# Patient Record
Sex: MALE | Race: BLACK OR AFRICAN AMERICAN | NOT HISPANIC OR LATINO | URBAN - METROPOLITAN AREA
[De-identification: names, ages, dates, MRNs, and addresses within clinical notes are randomized per-mention and may not be internally consistent; named-entity substitution may affect disease eponyms.]

---

## 2020-07-08 ENCOUNTER — HOSPITAL ENCOUNTER (EMERGENCY)
Facility: HOSPITAL | Age: 50
Discharge: HOME | End: 2020-07-09
Attending: EMERGENCY MEDICINE
Payer: COMMERCIAL

## 2020-07-08 VITALS
DIASTOLIC BLOOD PRESSURE: 81 MMHG | HEART RATE: 82 BPM | TEMPERATURE: 98.8 F | RESPIRATION RATE: 16 BRPM | OXYGEN SATURATION: 97 % | BODY MASS INDEX: 41.75 KG/M2 | WEIGHT: 315 LBS | SYSTOLIC BLOOD PRESSURE: 148 MMHG | HEIGHT: 73 IN

## 2020-07-08 DIAGNOSIS — G57.11 NEUROPATHY OF RIGHT LATERAL FEMORAL CUTANEOUS NERVE: Primary | ICD-10-CM

## 2020-07-08 PROCEDURE — 99282 EMERGENCY DEPT VISIT SF MDM: CPT

## 2020-07-08 RX ORDER — LISINOPRIL AND HYDROCHLOROTHIAZIDE 12.5; 2 MG/1; MG/1
2 TABLET ORAL DAILY
Status: ON HOLD | COMMUNITY
Start: 2020-06-13 | End: 2020-08-07 | Stop reason: SDUPTHER

## 2020-07-08 RX ORDER — AMLODIPINE BESYLATE 10 MG/1
10 TABLET ORAL DAILY
Status: ON HOLD | COMMUNITY
Start: 2020-06-07 | End: 2020-08-07 | Stop reason: SDUPTHER

## 2020-07-08 RX ORDER — FUROSEMIDE 20 MG/1
20 TABLET ORAL DAILY
COMMUNITY
Start: 2020-06-07 | End: 2020-08-07 | Stop reason: HOSPADM

## 2020-07-08 ASSESSMENT — ENCOUNTER SYMPTOMS
FEVER: 0
DIFFICULTY URINATING: 0
HEADACHES: 0
WEAKNESS: 0
COUGH: 0
NUMBNESS: 1
BACK PAIN: 0
ABDOMINAL PAIN: 0
SHORTNESS OF BREATH: 0

## 2020-07-09 NOTE — ED PROVIDER NOTES
HPI pt presents to ED for right lateral leg numbness for weaks. Pt has chronic pain in left knee from no cartilage per pt. Pt did not fall. Pt is morbidly obese and usually gets around in home with an office chair but the wheels broke. Pt requesting a wheel chair to get around at home. Pt has not fallen. Pt denies any fever, cough, headache, new weakness, abd pain, nausea, vomiting. Pt states his pcp told him to come to ED. Pt is morbidly obese and tends to put a lot of pressure on right thigh    Chief Complaint   Patient presents with   • Lower Extremity Issue       HPI     Patient History     Past Medical History:   Diagnosis Date   • Hypertension    • Sleep apnea        Past Surgical History:   Procedure Laterality Date   • KNEE SURGERY         History reviewed. No pertinent family history.    Social History     Tobacco Use   • Smoking status: Never Smoker   • Smokeless tobacco: Never Used   Substance Use Topics   • Alcohol use: Not on file   • Drug use: Not on file       Systems Reviewed from Nursing Triage:          Review of Systems     Review of Systems   Constitutional: Negative for fever.   HENT: Negative for congestion.    Eyes: Negative for visual disturbance.   Respiratory: Negative for cough and shortness of breath.    Cardiovascular: Negative for chest pain.   Gastrointestinal: Negative for abdominal pain.   Genitourinary: Negative for difficulty urinating.   Musculoskeletal: Negative for back pain.   Skin: Negative for rash.   Neurological: Positive for numbness. Negative for weakness and headaches.        Physical Exam     ED Triage Vitals [07/08/20 2222]   Temp Heart Rate Resp BP SpO2   37.1 °C (98.8 °F) 82 16 (!) 148/81 97 %      Temp Source Heart Rate Source Patient Position BP Location FiO2 (%) (Set)   Oral Monitor Lying Right upper arm --                     Patient Vitals for the past 24 hrs:   BP Temp Temp src Pulse Resp SpO2 Height   07/08/20 2222 (!) 148/81 37.1 °C (98.8 °F) Oral 82 16 97 %  "1.854 m (6' 1\")                                          Physical Exam   Constitutional: He is oriented to person, place, and time. He appears well-developed.   Morbidly obese   HENT:   Head: Atraumatic.   Eyes: EOM are normal.   Neck: Neck supple.   Cardiovascular: Normal rate and regular rhythm.   Pulmonary/Chest: Effort normal and breath sounds normal.   Abdominal: Soft. There is no tenderness.   Musculoskeletal: Normal range of motion. He exhibits no edema or tenderness.   Neurological: He is alert and oriented to person, place, and time.   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

## 2020-08-05 ENCOUNTER — HOSPITAL ENCOUNTER (OUTPATIENT)
Facility: HOSPITAL | Age: 50
Setting detail: OBSERVATION
Discharge: HOME | End: 2020-08-07
Attending: EMERGENCY MEDICINE | Admitting: HOSPITALIST
Payer: COMMERCIAL

## 2020-08-05 ENCOUNTER — APPOINTMENT (EMERGENCY)
Dept: RADIOLOGY | Facility: HOSPITAL | Age: 50
End: 2020-08-05
Attending: EMERGENCY MEDICINE
Payer: COMMERCIAL

## 2020-08-05 DIAGNOSIS — E87.6 HYPOKALEMIA: ICD-10-CM

## 2020-08-05 DIAGNOSIS — R55 SYNCOPE, UNSPECIFIED SYNCOPE TYPE: Primary | ICD-10-CM

## 2020-08-05 PROBLEM — I10 HYPERTENSION: Status: ACTIVE | Noted: 2020-08-05

## 2020-08-05 PROBLEM — R94.31 EKG, ABNORMAL: Status: ACTIVE | Noted: 2020-08-05

## 2020-08-05 PROBLEM — G47.30 SLEEP APNEA: Status: ACTIVE | Noted: 2020-08-05

## 2020-08-05 PROBLEM — E87.8 ELECTROLYTE ABNORMALITY: Status: ACTIVE | Noted: 2020-08-05

## 2020-08-05 LAB
ALBUMIN SERPL-MCNC: 3.7 G/DL (ref 3.4–5)
ALP SERPL-CCNC: 58 IU/L (ref 35–126)
ALT SERPL-CCNC: 15 IU/L (ref 16–63)
ANION GAP SERPL CALC-SCNC: 14 MEQ/L (ref 3–15)
AST SERPL-CCNC: 20 IU/L (ref 15–41)
ATRIAL RATE: 80
BASOPHILS # BLD: 0.04 K/UL (ref 0.01–0.1)
BASOPHILS NFR BLD: 0.6 %
BILIRUB SERPL-MCNC: 0.7 MG/DL (ref 0.3–1.2)
BUN SERPL-MCNC: 11 MG/DL (ref 8–20)
CALCIUM SERPL-MCNC: 9.3 MG/DL (ref 8.9–10.3)
CHLORIDE SERPL-SCNC: 96 MEQ/L (ref 98–109)
CO2 SERPL-SCNC: 23 MEQ/L (ref 22–32)
CREAT SERPL-MCNC: 1.5 MG/DL (ref 0.8–1.3)
DIFFERENTIAL METHOD BLD: NORMAL
EOSINOPHIL # BLD: 0.2 K/UL (ref 0.04–0.54)
EOSINOPHIL NFR BLD: 3 %
ERYTHROCYTE [DISTWIDTH] IN BLOOD BY AUTOMATED COUNT: 12.9 % (ref 11.6–14.4)
GFR SERPL CREATININE-BSD FRML MDRD: 59.8 ML/MIN/1.73M*2
GLUCOSE SERPL-MCNC: 172 MG/DL (ref 70–99)
HCT VFR BLDCO AUTO: 43.1 % (ref 40.1–51)
HGB BLD-MCNC: 14.8 G/DL (ref 13.7–17.5)
IMM GRANULOCYTES # BLD AUTO: 0.02 K/UL (ref 0–0.08)
IMM GRANULOCYTES NFR BLD AUTO: 0.3 %
LYMPHOCYTES # BLD: 2.79 K/UL (ref 1.2–3.5)
LYMPHOCYTES NFR BLD: 42 %
MAGNESIUM SERPL-MCNC: 2 MG/DL (ref 1.8–2.5)
MCH RBC QN AUTO: 29.9 PG (ref 28–33.2)
MCHC RBC AUTO-ENTMCNC: 34.3 G/DL (ref 32.2–36.5)
MCV RBC AUTO: 87.1 FL (ref 83–98)
MONOCYTES # BLD: 0.34 K/UL (ref 0.3–1)
MONOCYTES NFR BLD: 5.1 %
NEUTROPHILS # BLD: 3.25 K/UL (ref 1.7–7)
NEUTS SEG NFR BLD: 49 %
NRBC BLD-RTO: 0 %
PDW BLD AUTO: 10.1 FL (ref 9.4–12.4)
PLATELET # BLD AUTO: 346 K/UL (ref 150–350)
POTASSIUM SERPL-SCNC: 2.9 MEQ/L (ref 3.6–5.1)
PROT SERPL-MCNC: 7.8 G/DL (ref 6–8.2)
QRS DURATION: 98
QT INTERVAL: 440
QTC CALCULATION(BAZETT): 507
R AXIS: 17
RBC # BLD AUTO: 4.95 M/UL (ref 4.5–5.8)
SARS-COV-2 RNA RESP QL NAA+PROBE: NEGATIVE
SODIUM SERPL-SCNC: 133 MEQ/L (ref 136–144)
T WAVE AXIS: 60
TROPONIN I SERPL-MCNC: <0.02 NG/ML
TROPONIN I SERPL-MCNC: <0.02 NG/ML
VENTRICULAR RATE: 80
WBC # BLD AUTO: 6.64 K/UL (ref 3.8–10.5)

## 2020-08-05 PROCEDURE — 93010 ELECTROCARDIOGRAM REPORT: CPT | Performed by: INTERNAL MEDICINE

## 2020-08-05 PROCEDURE — 85025 COMPLETE CBC W/AUTO DIFF WBC: CPT | Performed by: EMERGENCY MEDICINE

## 2020-08-05 PROCEDURE — 99220 PR INITIAL OBSERVATION CARE/DAY 70 MINUTES: CPT | Performed by: HOSPITALIST

## 2020-08-05 PROCEDURE — 84484 ASSAY OF TROPONIN QUANT: CPT | Mod: 91 | Performed by: HOSPITALIST

## 2020-08-05 PROCEDURE — 3E0337Z INTRODUCTION OF ELECTROLYTIC AND WATER BALANCE SUBSTANCE INTO PERIPHERAL VEIN, PERCUTANEOUS APPROACH: ICD-10-PCS | Performed by: HOSPITALIST

## 2020-08-05 PROCEDURE — 36415 COLL VENOUS BLD VENIPUNCTURE: CPT | Performed by: EMERGENCY MEDICINE

## 2020-08-05 PROCEDURE — G0378 HOSPITAL OBSERVATION PER HR: HCPCS

## 2020-08-05 PROCEDURE — U0002 COVID-19 LAB TEST NON-CDC: HCPCS | Performed by: PHYSICIAN ASSISTANT

## 2020-08-05 PROCEDURE — 93005 ELECTROCARDIOGRAM TRACING: CPT

## 2020-08-05 PROCEDURE — 96361 HYDRATE IV INFUSION ADD-ON: CPT

## 2020-08-05 PROCEDURE — 80053 COMPREHEN METABOLIC PANEL: CPT | Performed by: EMERGENCY MEDICINE

## 2020-08-05 PROCEDURE — 63700000 HC SELF-ADMINISTRABLE DRUG: Performed by: HOSPITALIST

## 2020-08-05 PROCEDURE — 94660 CPAP INITIATION&MGMT: CPT

## 2020-08-05 PROCEDURE — 84484 ASSAY OF TROPONIN QUANT: CPT | Performed by: EMERGENCY MEDICINE

## 2020-08-05 PROCEDURE — 3E033GC INTRODUCTION OF OTHER THERAPEUTIC SUBSTANCE INTO PERIPHERAL VEIN, PERCUTANEOUS APPROACH: ICD-10-PCS | Performed by: HOSPITALIST

## 2020-08-05 PROCEDURE — 3E0337Z INTRODUCTION OF ELECTROLYTIC AND WATER BALANCE SUBSTANCE INTO PERIPHERAL VEIN, PERCUTANEOUS APPROACH: ICD-10-PCS | Performed by: EMERGENCY MEDICINE

## 2020-08-05 PROCEDURE — 63600000 HC DRUGS/DETAIL CODE: Performed by: PHYSICIAN ASSISTANT

## 2020-08-05 PROCEDURE — 1123F ACP DISCUSS/DSCN MKR DOCD: CPT | Performed by: HOSPITALIST

## 2020-08-05 PROCEDURE — 93005 ELECTROCARDIOGRAM TRACING: CPT | Performed by: EMERGENCY MEDICINE

## 2020-08-05 PROCEDURE — 83735 ASSAY OF MAGNESIUM: CPT | Performed by: PHYSICIAN ASSISTANT

## 2020-08-05 PROCEDURE — 71045 X-RAY EXAM CHEST 1 VIEW: CPT

## 2020-08-05 PROCEDURE — 99285 EMERGENCY DEPT VISIT HI MDM: CPT | Mod: 25

## 2020-08-05 PROCEDURE — 25800000 HC PHARMACY IV SOLUTIONS: Performed by: PHYSICIAN ASSISTANT

## 2020-08-05 PROCEDURE — 70450 CT HEAD/BRAIN W/O DYE: CPT

## 2020-08-05 PROCEDURE — 96366 THER/PROPH/DIAG IV INF ADDON: CPT

## 2020-08-05 PROCEDURE — 96365 THER/PROPH/DIAG IV INF INIT: CPT

## 2020-08-05 RX ORDER — DEXTROSE 50 % IN WATER (D50W) INTRAVENOUS SYRINGE
25 AS NEEDED
Status: DISCONTINUED | OUTPATIENT
Start: 2020-08-05 | End: 2020-08-07 | Stop reason: HOSPADM

## 2020-08-05 RX ORDER — DEXTROSE 40 %
15-30 GEL (GRAM) ORAL AS NEEDED
Status: DISCONTINUED | OUTPATIENT
Start: 2020-08-05 | End: 2020-08-07 | Stop reason: HOSPADM

## 2020-08-05 RX ORDER — SODIUM CHLORIDE 9 MG/ML
INJECTION, SOLUTION INTRAVENOUS CONTINUOUS
Status: ACTIVE | OUTPATIENT
Start: 2020-08-05 | End: 2020-08-05

## 2020-08-05 RX ORDER — NITROGLYCERIN 0.4 MG/1
0.4 TABLET SUBLINGUAL EVERY 5 MIN PRN
Status: DISCONTINUED | OUTPATIENT
Start: 2020-08-05 | End: 2020-08-07 | Stop reason: HOSPADM

## 2020-08-05 RX ORDER — IBUPROFEN 200 MG
16-32 TABLET ORAL AS NEEDED
Status: DISCONTINUED | OUTPATIENT
Start: 2020-08-05 | End: 2020-08-07 | Stop reason: HOSPADM

## 2020-08-05 RX ORDER — NAPROXEN SODIUM 220 MG/1
324 TABLET, FILM COATED ORAL ONCE
Status: COMPLETED | OUTPATIENT
Start: 2020-08-05 | End: 2020-08-05

## 2020-08-05 RX ADMIN — SODIUM CHLORIDE 40 MEQ: 0.9 INJECTION, SOLUTION INTRAVENOUS at 14:20

## 2020-08-05 RX ADMIN — SODIUM CHLORIDE 500 ML: 9 INJECTION, SOLUTION INTRAVENOUS at 11:46

## 2020-08-05 RX ADMIN — ASPIRIN 81 MG 324 MG: 81 TABLET ORAL at 14:46

## 2020-08-05 RX ADMIN — SODIUM CHLORIDE: 9 INJECTION, SOLUTION INTRAVENOUS at 14:15

## 2020-08-05 SDOH — HEALTH STABILITY: MENTAL HEALTH: HOW OFTEN DO YOU HAVE A DRINK CONTAINING ALCOHOL?: NEVER

## 2020-08-05 ASSESSMENT — ENCOUNTER SYMPTOMS
CHILLS: 0
PALPITATIONS: 0
BLURRED VISION: 0
NECK PAIN: 0
DIZZINESS: 1
MYALGIAS: 0
MEMORY LOSS: 0
SORE THROAT: 0
DIFFICULTY URINATING: 0
LIGHT-HEADEDNESS: 1
ALTERED MENTAL STATUS: 0
SYNCOPE: 1
DIAPHORESIS: 0
SEIZURES: 0
FEVER: 0
SHORTNESS OF BREATH: 0
DIARRHEA: 0
BACK PAIN: 0
BRUISES/BLEEDS EASILY: 0
ABDOMINAL PAIN: 0
WEAKNESS: 1
COUGH: 0
HEADACHES: 1
DYSPNEA ON EXERTION: 0
VOMITING: 0
HEMATOCHEZIA: 0
NUMBNESS: 0
FREQUENCY: 0
POLYDIPSIA: 0
NAUSEA: 0
DYSURIA: 0
SPEECH DIFFICULTY: 0
ORTHOPNEA: 0
DOUBLE VISION: 0
LIGHT-HEADEDNESS: 0

## 2020-08-05 ASSESSMENT — COGNITIVE AND FUNCTIONAL STATUS - GENERAL
STANDING UP FROM CHAIR USING ARMS: 2 - A LOT
TOILETING: 2 - A LOT
EATING MEALS: 4 - NONE
DRESSING REGULAR UPPER BODY CLOTHING: 2 - A LOT
DRESSING REGULAR LOWER BODY CLOTHING: 2 - A LOT
HELP NEEDED FOR PERSONAL GROOMING: 2 - A LOT
MOVING TO AND FROM BED TO CHAIR: 2 - A LOT
HELP NEEDED FOR BATHING: 2 - A LOT
WALKING IN HOSPITAL ROOM: 2 - A LOT
DO YOU HAVE SERIOUS DIFFICULTY WALKING OR CLIMBING STAIRS: AMBULATION DIFFICULTY, REQUIRES EQUIPMENT
CLIMB 3 TO 5 STEPS WITH RAILING: 2 - A LOT

## 2020-08-05 NOTE — CONSULTS
Neurology Consult Note    Subjective     Raman Fields is a 50 y.o. man with history of hypertension, sleep apnea, who was admitted on 8/5/2020.  He passed out this morning.  He was in the bathroom, had had a bowel movement and then went into the shower.  While he was coming out of the shower he felt dizzy with room spinning, had left arm weakness, then passed out.  He had numbness in the left arm when he woke up.    He had a similar episode about a week ago, but did not pass out.    His wife called 911.    He takes amlodipine, Lasix, lisinopril, hydrochlorothiazide, Prinzide, Zestoretic as an outpatient.    Review of Systems  All other systems reviewed and negative except as noted in the HPI.    Allergies: No known drug allergies    Current Inpatient Medications   Medication Dose Route Frequency Provider Last Rate Last Dose   • glucose chewable tablet 16-32 g of dextrose  16-32 g of dextrose oral PRN Bossman Runao MD        Or   • dextrose 40 % oral gel 15-30 g of dextrose  15-30 g of dextrose oral PRN Bossman Ruano MD        Or   • glucagon (GLUCAGEN) injection 1 mg  1 mg intramuscular PRN Bossman Ruano MD        Or   • dextrose in water injection 12.5 g  25 mL intravenous PRN Bossman Ruano MD       • nitroglycerin (NITROSTAT) SL tablet 0.4 mg  0.4 mg sublingual q5 min PRN Bossman Ruano MD           Medical History:   Past Medical History:   Diagnosis Date   • Hypertension    • Sleep apnea        Surgical History:   Past Surgical History:   Procedure Laterality Date   • KNEE SURGERY         Social History:   Social History     Socioeconomic History   • Marital status: Single     Spouse name: None   • Number of children: None   • Years of education: None   • Highest education level: None   Occupational History   • None   Social Needs   • Financial resource strain: None   • Food insecurity:     Worry: None     Inability: None   • Transportation needs:     Medical: None     Non-medical: None   Tobacco Use   •  Smoking status: Never Smoker   • Smokeless tobacco: Never Used   Substance and Sexual Activity   • Alcohol use: Never     Frequency: Never   • Drug use: Never   • Sexual activity: None   Lifestyle   • Physical activity:     Days per week: None     Minutes per session: None   • Stress: None   Relationships   • Social connections:     Talks on phone: None     Gets together: None     Attends [employer] service: None     Active member of club or organization: None     Attends meetings of clubs or organizations: None     Relationship status: None   • Intimate partner violence:     Fear of current or ex partner: None     Emotionally abused: None     Physically abused: None     Forced sexual activity: None   Other Topics Concern   • None   Social History Narrative   • None       Family History:   Family History   Problem Relation Age of Onset   • Colon cancer Biological Mother        Objective     Physical Exam  Awake, alert, speech is clear, language intact.    Cranial nerves -pupils are equal, round, reactive to light and accommodation.  Extraocular muscle movements are intact.  Visual fields are full to confrontation bilaterally.  There is no facial asymmetry.  Facial sensation is intact to light touch.  Tongue is midline.    Motor -no drift.  There is no focal weakness with manual motor testing in the arms, hands, legs, feet.    Coordination rapid alternating movements are symmetric.  Finger to nose is intact.    Sensation - intact to light touch, pinprick.    Reflexes -biceps 2/4 bilaterally, knee jerks 2/4 bilaterally, with toes downgoing bilaterally.    Gait - normal on casual walking.      Labs  Sodium 133, potassium 2.9, chloride 96, bicarbonate 23, BUN 11, creatinine 1.5, glucose 172, calcium 9.3, LFTs within normal limits, GFR 60, magnesium 2.0, troponin< 0.02, WBC 6.6, hemoglobin 14.8, platelets 346, SARS-CoV-2 negative.    Neurologic Studies  CT head 8/5/2020 -   No intracranial hemorrhage or extra axial  collection. No mass, mass effect or  edema.  Ventricles and sulci are normal in size and configuration. No CT  evidence of acute large territorial infarction. There is nonspecific asymmetry  larger left temporalis muscle.  There is nonspecific bilateral proptosis.     IMPRESSION:  No acute intracranial abnormality.    Cardiac  EKG at 8/5/2020 -  Accelerated junctional rhythm, prolonged QT interval or Q fusion    Syncope  Assessment & Plan  Admitted 8/5/2020 with syncope, passed out after having a bowel movement and then taking a shower.   Likely vasovagal, volume depletion.    He does not take aspirin.  He takes multiple blood pressure medicines.    Hypertension  Obstructive sleep apnea    Neurologic exam - no focal findings.     BP was 101/51 at the time of ER presentation which is much lower than usual.    CT head -no acute findings.    PLAN     1.) check MRI brain    2.) check MRA head and neck     3.) telemetry     4.) cardiology following          Assessment         Plan

## 2020-08-05 NOTE — ASSESSMENT & PLAN NOTE
Secondary to hypotension from volume depletion.  Volume and electrolye replacement.  Would keep off of furosemide.  Okay to continue hydrochlorothiazide for hypertension.    Manage lower extremity edema with compression stockings.

## 2020-08-05 NOTE — ASSESSMENT & PLAN NOTE
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

## 2020-08-05 NOTE — PLAN OF CARE
Problem: Adult Inpatient Plan of Care  Goal: Plan of Care Review  Outcome: Progressing  Flowsheets (Taken 8/5/2020 0443)  Progress: improving  Plan of Care Reviewed With: patient  Outcome Summary: Pt transferred from ED at 1630. IVF infusing as per orders. Pt oriented to room. Vitals charted, cardiac monitor placed on pt. Pt denies any pain/discomfort. Side rails padded. Pt resting comfortably in bed with bed alarm on, wheels locked and call bell within reach.

## 2020-08-05 NOTE — CONSULTS
CARDIOLOGY CONSULT NOTE    Raman Fields is a 50 y.o. male who was admitted for syncope.  This occurred  Today when patient came out of the shower and was reaching for his back to scratch an itch.  No associated chest pain, palpitations, shortness of breath, dizziness.  His wife told him that he was shaking.  He fell to the floor and upon arousal, he was not confused.  He knew exactly what happened and where he was.  He recalls a similar feeling one week ago when he arose from bed and became very lightheaded.  No syncope then.  He endorses the fact that his LE edema has resolved since 2-3 weeks ago.  He continues to take lasix daily.  His edema worsened when he was off of his CPAP in the past few months.  He started to have choking while wearing the mask therefore stopped alltogether.  He has noticed increased fatigue in the past few weeks.  His BP was 101/51 at the time of ER presentation which is much lower than usual.  With IVFs, his BP is improving.      Past Medical History:   Diagnosis Date   • Hypertension    • Sleep apnea        Past Surgical History:   Procedure Laterality Date   • KNEE SURGERY         Social History     Socioeconomic History   • Marital status: Single     Spouse name: None   • Number of children: None   • Years of education: None   • Highest education level: None   Occupational History   • None   Social Needs   • Financial resource strain: None   • Food insecurity:     Worry: None     Inability: None   • Transportation needs:     Medical: None     Non-medical: None   Tobacco Use   • Smoking status: Never Smoker   • Smokeless tobacco: Never Used   Substance and Sexual Activity   • Alcohol use: Never     Frequency: Never   • Drug use: Never   • Sexual activity: None   Lifestyle   • Physical activity:     Days per week: None     Minutes per session: None   • Stress: None   Relationships   • Social connections:     Talks on phone: None     Gets together: None     Attends Gnosticism service:  None     Active member of club or organization: None     Attends meetings of clubs or organizations: None     Relationship status: None   • Intimate partner violence:     Fear of current or ex partner: None     Emotionally abused: None     Physically abused: None     Forced sexual activity: None   Other Topics Concern   • None   Social History Narrative   • None       Family History   Problem Relation Age of Onset   • Colon cancer Biological Mother        ALLERGIES  No known drug allergies    Prior to Admission medications    Medication Sig Start Date End Date Taking? Authorizing Provider   amLODIPine (NORVASC) 10 mg tablet Take 10 mg by mouth daily.   6/7/20   Lul Cheung MD   furosemide (LASIX) 20 mg tablet Take 20 mg by mouth daily.   6/7/20   Lul Cheung MD   lisinopriL-hydrochlorothiazide (PRINZIDE,ZESTORETIC) 20-12.5 mg per tablet Take 2 tablets by mouth daily.   6/13/20   Lul Cheung MD       CURRENT IP MEDS:    • sodium chloride 0.9 %   50 mL/hr at 08/05/20 1415       REVIEW OF SYSTEMS:  Review of Systems   Constitution: Negative for chills and fever.   HENT: Negative for hearing loss and sore throat.    Eyes: Negative for blurred vision and double vision.   Cardiovascular: Positive for syncope. Negative for chest pain, dyspnea on exertion, leg swelling, orthopnea and palpitations.   Respiratory: Negative for cough and shortness of breath.    Endocrine: Negative for polydipsia and polyuria.   Hematologic/Lymphatic: Negative for bleeding problem. Does not bruise/bleed easily.   Skin: Negative for itching and rash.   Musculoskeletal: Negative for joint pain and myalgias.   Gastrointestinal: Negative for abdominal pain, hematochezia, melena, nausea and vomiting.   Genitourinary: Negative for frequency and urgency.   Neurological: Positive for dizziness and light-headedness.   Psychiatric/Behavioral: Negative for altered mental status and memory loss.   Allergic/Immunologic:  Negative for hives.       Objective     VITAL SIGNS LAST 24 HOURS:  Temp:  [36.8 °C (98.2 °F)-37.2 °C (98.9 °F)] 36.8 °C (98.2 °F)  Heart Rate:  [77-82] 81  Resp:  [12-19] 18  BP: (101-130)/(51-96) 109/96    Intake/Output Summary (Last 24 hours) at 8/5/2020 1845  Last data filed at 8/5/2020 1415  Gross per 24 hour   Intake 740 ml   Output 450 ml   Net 290 ml       WEIGHTS:  Wt Readings from Last 3 Encounters:   08/05/20 (!) 216 kg (476 lb)   07/08/20 (!) 226 kg (498 lb)       PHYSICAL EXAM:  Physical Exam   Constitutional: He is oriented to person, place, and time. He appears well-developed and well-nourished. No distress.   HENT:   Head: Normocephalic and atraumatic.   Eyes: Conjunctivae are normal. No scleral icterus.   Neck: Neck supple. No JVD present. Carotid bruit is not present. No thyromegaly present.   Cardiovascular: Normal rate, regular rhythm, normal heart sounds and intact distal pulses. Exam reveals no gallop and no friction rub.   No murmur heard.  Pulmonary/Chest: Effort normal. No respiratory distress.   Abdominal: Soft. Bowel sounds are normal.   Musculoskeletal: Normal range of motion. He exhibits no edema or tenderness.   Neurological: He is alert and oriented to person, place, and time.   Skin: Skin is warm and dry.   Psychiatric: His behavior is normal.       LABS:   Results from last 7 days   Lab Units 08/05/20  1126   SODIUM mEQ/L 133*   POTASSIUM mEQ/L 2.9*   CHLORIDE mEQ/L 96*   CO2 mEQ/L 23   BUN mg/dL 11   CREATININE mg/dL 1.5*   GLUCOSE mg/dL 172*   CALCIUM mg/dL 9.3       Results from last 7 days   Lab Units 08/05/20  1126   AST IU/L 20     Results from last 7 days   Lab Units 08/05/20  1126   ALT IU/L 15*     Results from last 7 days   Lab Units 08/05/20  1126   WBC K/uL 6.64   HEMOGLOBIN g/dL 14.8   HEMATOCRIT % 43.1   PLATELETS K/uL 346       Results from last 7 days   Lab Units 08/05/20  1800 08/05/20  1126   TROPONIN I ng/mL <0.02 <0.02     PT/PTT Results     No lab values to  "display.        Lab Results   Component Value Date    CHOL 166 02/14/2017    TRIG 77 02/14/2017    HDL 27 (L) 02/14/2017    LDLCALC 124 (H) 02/14/2017    DDIMER  02/13/2017     <0.27  The D-Dimer Assay can be used as an aid in the diagnosis of DVT / PE.  The test cannot be used by itself to exclude DVT or PE.  When used as a diagnostic aid, the cutoff value is the same as the reference range: <0.5 UG/ML FEU.         IMAGING:  Ct Head Without Iv Contrast    Result Date: 8/5/2020  IMPRESSION: No acute intracranial abnormality.    X-ray Chest 1 View    Result Date: 8/5/2020  IMPRESSION: No active disease in the chest           EKG:  NSR, early repolarization    TELEMETRY:  NSR    Assessment/Plan   Syncope  Assessment & Plan  Secondary to hypotension from volume depletion.  Volume and electrolye replacement.    Electrolyte abnormality  Assessment & Plan  Induced by multiple diuretics.  Replace K to above 3.5.    Sleep apnea  Assessment & Plan  Recent noncompliance with CPAP.  Resume therapy.  Patient may need readjustments in settings given \"choking\" with his CPAP.    Hypertension  Assessment & Plan  History of hypertension.  BP today much lower than usual as per patient.  Hold lasix, HCTZ, lisinopril.  Reintroduce amlodipine as BP dictates.              Cat Mcknight, DO  8/5/2020    "

## 2020-08-05 NOTE — ED ATTESTATION NOTE
The patient was evaluated and managed by the physician assistant / nurse practitioner.     Andres Stephen MD  08/05/20 8752

## 2020-08-05 NOTE — ED PROVIDER NOTES
HPI     Chief Complaint   Patient presents with   • Syncope       51 yo M morbid obesity ALYSSA HTN presents to ED following an episode of syncope 1 hour PTA.  He states he had a bowel movement and then got in the shower and while getting out of the shower experienced room-spinning dizziness, felt weak in his left arm, and states he passed out, slipping onto ground.  He had called for his wife before it happened and she called 911.  Pt states a few days ago after having a bowel movement he has experienced similar dizziness.  He states he feels a mild headache coming on.  He denies speech or vision change, neck pain, jaw pain, chest pain, SOB, palpitations, cough, abdominal pain, vomiting, diarrhea, back pain.      Syncope   Associated symptoms: dizziness, headaches and weakness    Associated symptoms: no chest pain, no diaphoresis, no fever, no nausea, no palpitations, no seizures, no shortness of breath and no vomiting         Patient History     Past Medical History:   Diagnosis Date   • Hypertension    • Sleep apnea        Past Surgical History:   Procedure Laterality Date   • KNEE SURGERY         History reviewed. No pertinent family history.    Social History     Tobacco Use   • Smoking status: Never Smoker   • Smokeless tobacco: Never Used   Substance Use Topics   • Alcohol use: Never     Frequency: Never   • Drug use: Never       Systems Reviewed from Nursing Triage:  Allergies  Meds          Review of Systems     Review of Systems   Constitutional: Negative for chills, diaphoresis and fever.   Eyes: Negative for visual disturbance.   Respiratory: Negative for cough and shortness of breath.    Cardiovascular: Positive for syncope. Negative for chest pain, palpitations and leg swelling.   Gastrointestinal: Negative for abdominal pain, diarrhea, nausea and vomiting.   Genitourinary: Negative for difficulty urinating and dysuria.   Musculoskeletal: Negative for back pain and neck pain.   Neurological: Positive  "for dizziness, syncope, weakness and headaches. Negative for seizures, speech difficulty, light-headedness and numbness.        Physical Exam     ED Triage Vitals   Temp Heart Rate Resp BP SpO2   08/05/20 1110 08/05/20 1110 08/05/20 1110 08/05/20 1110 08/05/20 1110   37.2 °C (98.9 °F) 82 12 (!) 101/51 97 %      Temp Source Heart Rate Source Patient Position BP Location FiO2 (%) (Set)   08/05/20 1110 08/05/20 1257 08/05/20 1110 08/05/20 1110 --   Oral Monitor Lying Right forearm        Pulse Ox %: 96 % (08/05/20 1331)  Pulse Ox Interpretation: Normal (08/05/20 1331)  Heart Rate: 80 (08/05/20 1331)  Rhythm Strip Interpretation: Junctional Rhythm (08/05/20 1331)    Patient Vitals for the past 24 hrs:   BP Temp Temp src Pulse Resp SpO2 Height Weight   08/05/20 1257 130/70 -- -- 79 18 96 % -- --   08/05/20 1134 (!) 112/59 -- -- 77 19 97 % -- --   08/05/20 1110 (!) 101/51 37.2 °C (98.9 °F) Oral 82 12 97 % 1.854 m (6' 1\") (!) 226 kg (498 lb)                               NIH Stroke Scale: 0            Physical Exam   Constitutional: He is oriented to person, place, and time. He appears well-developed. No distress.   HENT:   Head: Normocephalic and atraumatic.   Eyes: Pupils are equal, round, and reactive to light. Conjunctivae and EOM are normal.   Neck: Neck supple.   Cardiovascular: Normal rate, regular rhythm and intact distal pulses.   Pulmonary/Chest: Effort normal and breath sounds normal.   Abdominal: Soft. There is no tenderness. There is no rebound and no guarding.   Neurological: He is alert and oriented to person, place, and time. No sensory deficit. He exhibits normal muscle tone. Coordination normal.   Skin: Skin is warm. Capillary refill takes less than 2 seconds. He is not diaphoretic.   Psychiatric: He has a normal mood and affect.   Nursing note and vitals reviewed.           Procedures    Labs Reviewed   COMPREHENSIVE METABOLIC PANEL - Abnormal       Result Value    Sodium 133 (*)     Potassium 2.9 (*)  "    Chloride 96 (*)     CO2 23      BUN 11      Creatinine 1.5 (*)     Glucose 172 (*)     Calcium 9.3      AST (SGOT) 20      ALT (SGPT) 15 (*)     Alkaline Phosphatase 58      Total Protein 7.8      Albumin 3.7      Bilirubin, Total 0.7      eGFR 59.8 (*)     Anion Gap 14     TROPONIN I - Normal    Troponin I <0.02     MAGNESIUM - Normal    Magnesium 2.0     SARS-COV-2 (COVID 19), PCR   CBC AND DIFF    WBC 6.64      RBC 4.95      Hemoglobin 14.8      Hematocrit 43.1      MCV 87.1      MCH 29.9      MCHC 34.3      RDW 12.9      Platelets 346      MPV 10.1      Differential Type Auto      nRBC 0.0      Immature Granulocytes 0.3      Neutrophils 49.0      Lymphocytes 42.0      Monocytes 5.1      Eosinophils 3.0      Basophils 0.6      Immature Granulocytes, Absolute 0.02      Neutrophils, Absolute 3.25      Lymphocytes, Absolute 2.79      Monocytes, Absolute 0.34      Eosinophils, Absolute 0.20      Basophils, Absolute 0.04         CT HEAD WITHOUT IV CONTRAST   Final Result   IMPRESSION:      No acute intracranial abnormality.      X-RAY CHEST 1 VIEW   Final Result   IMPRESSION:   No active disease in the chest      ECG 12 lead   ED Interpretation   nsr 80, nml axis, qtc 5Accelerated Junctional rhythm   Prolonged QT interval or tu fusion, consider myocardial disease, electrolyte imbalance, or drug effects   When compared with ECG of 13-FEB-2017 08:45,   Junctional rhythm has replaced Sinus rhythm   Nonspecific T wave abnormality no longer evident in Lateral leads   QT has [id number], no st elevation      Final Result      ECG 12 lead    (Results Pending)               ED Course & [facility]         ED Course as of Aug 05 1402   Wed Aug 05, 2020   1317 Potassium(!): 2.9 [SS]   1317 Creatinine(!): 1.5 [SS]   1327 Accelerated Junctional rhythm  Prolonged QT interval or tu fusion, consider myocardial disease, electrolyte imbalance, or drug effects  Junctional rhythm has replaced Sinus rhythm  QT has lengthened; will  admit patient for further syncopal work up and electrolyte repletion    [SS]   1335 Updated patient on all results, plan of care, admission. Pt resting comfortably, stating he is tired and thirsty. He states he does not have a cardiologist.    [SS]   1401 Signed out to [facility] Dr Ruano for admission    [SS]      ED Course User Index  [SS] Debra Dickson PA C         Clinical Impressions as of Aug 05 1402   Syncope, unspecified syncope type   Hypokalemia        Debra Dickson PA C  08/05/20 1402

## 2020-08-05 NOTE — ASSESSMENT & PLAN NOTE
Junctional rhythm with syncope  Monitor on telemetry  Troponins negative  Appreciate cards input-EKG felt to be sinus rhythm  No further cardiac workup at this time

## 2020-08-05 NOTE — H&P
Hospital Medicine Service -  History & Physical        CHIEF COMPLAINT   Passed out    History from Patient, discussion/documentation of ER team /EMR.     [place] team consulted [facility] [place]. I examined the patient in Emergency Room.     HISTORY OF PRESENT ILLNESS      On my evaluation, very pleasant 50 male, coming in for above symptoms.  Patient told me he passed out this morning about 1 hour prior to arrival to the hospital.  He told me he was in his normal state of health this morning, when he went for a bowel movement, and he went into the shower and while he was coming out of shower he felt dizziness, with room spinning, with weakness, mainly left arm, and passed out.  He told me he had numbness in left arm, with some itching in the shoulder.   He told me he had similar episode about a week ago, itching left shoulder, dizziness, but at that time he did not pass out.    His wife called 911.  Patient denies chest pain jaw pain arm pain back pain.  Denies shortness of breath.  Denies tingling/numbness/headache or paralysis.  Denies photophobia/blurred vision.  Currently denies any neuro concerns.    Troponin is normal.  CT brain is negative.  Patient denies fever or other concerns.  Will be monitored.  Cardiology and neurology team was consulted for further assistance.  Patient currently denies any concerns or symptoms.    Stable Pulse Ox, HR and BP on my evaluation.     I discussed the risks, benefits and rationale of treatment & need for further workup & monitoring--patient is agreeable with the management.   All questions answered to patients satisfaction.     Pt will be admitted with further care to be followed by Day Hospitalist team along with consults, at which time new recommendations to follow.  PAST MEDICAL AND SURGICAL HISTORY      Past Medical History:   Diagnosis Date   • Hypertension    • Sleep apnea        Past Surgical History:   Procedure Laterality Date   • KNEE SURGERY         MEDICATIONS       Prior to Admission medications    Medication Sig Start Date End Date Taking? Authorizing Provider   amLODIPine (NORVASC) 10 mg tablet Take 10 mg by mouth daily.   6/7/20   Lul Cheung MD   furosemide (LASIX) 20 mg tablet Take 20 mg by mouth daily.   6/7/20   Lul Cheung MD   lisinopriL-hydrochlorothiazide (PRINZIDE,ZESTORETIC) 20-12.5 mg per tablet Take 2 tablets by mouth daily.   6/13/20   Lul Cheung MD       ALLERGIES      No known drug allergies    FAMILY HISTORY      History reviewed. No pertinent family history.    SOCIAL HISTORY      Social History     Socioeconomic History   • Marital status: Single     Spouse name: None   • Number of children: None   • Years of education: None   • Highest education level: None   Occupational History   • None   Social Needs   • Financial resource strain: None   • Food insecurity:     Worry: None     Inability: None   • Transportation needs:     Medical: None     Non-medical: None   Tobacco Use   • Smoking status: Never Smoker   • Smokeless tobacco: Never Used   Substance and Sexual Activity   • Alcohol use: Never     Frequency: Never   • Drug use: Never   • Sexual activity: None   Lifestyle   • Physical activity:     Days per week: None     Minutes per session: None   • Stress: None   Relationships   • Social connections:     Talks on phone: None     Gets together: None     Attends [employer] service: None     Active member of club or organization: None     Attends meetings of clubs or organizations: None     Relationship status: None   • Intimate partner violence:     Fear of current or ex partner: None     Emotionally abused: None     Physically abused: None     Forced sexual activity: None   Other Topics Concern   • None   Social History Narrative   • None       REVIEW OF SYSTEMS      All other systems reviewed and negative except as noted in HPI    PHYSICAL EXAMINATION      Temp:  [37.2 °C (98.9 °F)] 37.2 °C (98.9 °F)  Heart Rate:  [77-82]  79  Resp:  [12-19] 18  BP: (101-130)/(51-70) 130/70  Body mass index is 65.7 kg/m².    Physical Exam     Constitutional: Alert, Not in distress.    HEENT: PERRLA.  NC/AT. EOMI    Neck: Supple.    CVS: Regular rhythm/Rate.     Pulmonary: B/L EAE, No added sounds.    Abdominal: Soft, Non tender. +BS.  Obese.    Musculoskeletal: No Pedal edema.     Neurological: alert/awake. moving all 4 extremities.  Oriented x3.  5/5 power all 4 extremities.  Intact sensations.  Cranial nerves normal.    Skin: No cyansois.  Warm and dry.    Psych- Normal affect.    Nursing note and vitals reviewed.  LABS / IMAGING / EKG        Labs  Labs from today  reviewed. [facility] [place] to follow tomorrow morning labs.    Imaging  Imaging reviewed by myself with Radiologist Read noted.    ECG/Telemetry  Telemetry Monitoring reviewed in ER.    ASSESSMENT AND PLAN         Electrolyte abnormality  Low sodium and potassium-replace and trend    EKG, abnormal  Junctional rhythm with syncope  Monitor on telemetry  Cycle troponins  Management per cardiology    Hypertension  Low normal blood pressure on presentation  Hold all blood pressure medication and trend  Resume per clinical response and blood pressure trend    Sleep apnea  Respite therapist evaluation/CPAP PRN  Pulse ox monitoring    Syncope  Syncope  uncertain etiology-vasovagal?-Does have EKG of normality junctional rhythm?-Cardiology etiology?  No evidence of tachy or bradyarrythmias on tele so far  Initail troponin within normal limit  EKG does not show evidence of Acue Ischemia  No acute findings on CT brain  Continue Tele monitoring for arrythmia  Orthostatic vitals monitoring  Adjust blood pressure medications and follow BP trends ( will hold bp meds )  Echocardiogram-per cardiology   cycle troponins  Consider vascular ultrasound of carotids/EEG- if concerns for neuro symptoms  Monitor and replace electrolytes  Rule out polypharmacy if all above excluded             VTE Assessment: Padua VTE Score:  3  VTE Treatment Plan: Heparin  Code Status: Full Code  Estimated discharge date: 8/6/2020     Bossman Ruano MD  8/5/2020

## 2020-08-05 NOTE — ASSESSMENT & PLAN NOTE
History of hypertension.  BP today high after volume replacement.  Probably back to his baseline.  Hold lasix   Resume HCTZ, lisinopril and amlodipine.

## 2020-08-05 NOTE — ASSESSMENT & PLAN NOTE
Admitted 8/5/2020 with syncope, passed out after having a bowel movement and then taking a shower.   Likely volume depletion.    He does not take aspirin.  He takes multiple blood pressure medicines.    Hypertension  Obstructive sleep apnea    Neurologic exam - no focal findings.     BP was 101/51 at the time of ER presentation which is much lower than usual.    CT head -no acute findings.    MRI brain - normal.      PLAN     1.) lasix dc'd    2.) resumed  Lisinopril, to resume amlopidine as bp allows    3.) cardiology following

## 2020-08-05 NOTE — ASSESSMENT & PLAN NOTE
"Recent noncompliance with CPAP.  Resume therapy.  Patient may need readjustments in settings given \"choking\" with his CPAP.  "

## 2020-08-05 NOTE — ASSESSMENT & PLAN NOTE
Patient states he stopped using his CPAP machine a few months ago because he feels that it is not working any longer  He follows with neurology at [facility]  Suggested that he follow-up with them to discuss having machine adjusted

## 2020-08-05 NOTE — ASSESSMENT & PLAN NOTE
Patient dated on 8/5/2020 had an episode of syncope, patient reports he had a similar episode 1 week ago  uncertain etiology-vasovagal?  Versus dehydration as his blood pressure was noted to be on the lower side  Monitor without evidence of malignant arrhythmia and troponins negative  Neurology recommended MRI which was done this morning without any acute abnormalities  Blood pressure is being adjusted-the hypertension

## 2020-08-06 ENCOUNTER — APPOINTMENT (OUTPATIENT)
Dept: RADIOLOGY | Facility: HOSPITAL | Age: 50
Setting detail: OBSERVATION
End: 2020-08-06
Attending: PSYCHIATRY & NEUROLOGY
Payer: COMMERCIAL

## 2020-08-06 ENCOUNTER — APPOINTMENT (OUTPATIENT)
Dept: RADIOLOGY | Facility: HOSPITAL | Age: 50
Setting detail: OBSERVATION
End: 2020-08-06
Attending: HOSPITALIST
Payer: COMMERCIAL

## 2020-08-06 PROBLEM — E66.01 CLASS 3 SEVERE OBESITY IN ADULT (CMS/HCC): Status: ACTIVE | Noted: 2020-08-06

## 2020-08-06 PROBLEM — E66.813 CLASS 3 SEVERE OBESITY IN ADULT (CMS/HCC): Status: ACTIVE | Noted: 2020-08-06

## 2020-08-06 PROBLEM — M17.0 OSTEOARTHRITIS OF BOTH KNEES: Status: ACTIVE | Noted: 2020-08-06

## 2020-08-06 PROBLEM — E87.6 HYPOKALEMIA: Status: ACTIVE | Noted: 2020-08-05

## 2020-08-06 LAB
ANION GAP SERPL CALC-SCNC: 10 MEQ/L (ref 3–15)
BUN SERPL-MCNC: 12 MG/DL (ref 8–20)
CALCIUM SERPL-MCNC: 9 MG/DL (ref 8.9–10.3)
CHLORIDE SERPL-SCNC: 99 MEQ/L (ref 98–109)
CO2 SERPL-SCNC: 25 MEQ/L (ref 22–32)
CREAT SERPL-MCNC: 1.1 MG/DL (ref 0.8–1.3)
ERYTHROCYTE [DISTWIDTH] IN BLOOD BY AUTOMATED COUNT: 13.1 % (ref 11.6–14.4)
GFR SERPL CREATININE-BSD FRML MDRD: >60 ML/MIN/1.73M*2
GLUCOSE SERPL-MCNC: 98 MG/DL (ref 70–99)
HCT VFR BLDCO AUTO: 40.2 % (ref 40.1–51)
HGB BLD-MCNC: 13.7 G/DL (ref 13.7–17.5)
MAGNESIUM SERPL-MCNC: 2 MG/DL (ref 1.8–2.5)
MCH RBC QN AUTO: 30.1 PG (ref 28–33.2)
MCHC RBC AUTO-ENTMCNC: 34.1 G/DL (ref 32.2–36.5)
MCV RBC AUTO: 88.4 FL (ref 83–98)
PDW BLD AUTO: 10.5 FL (ref 9.4–12.4)
PLATELET # BLD AUTO: 352 K/UL (ref 150–350)
POTASSIUM SERPL-SCNC: 3.4 MEQ/L (ref 3.6–5.1)
RBC # BLD AUTO: 4.55 M/UL (ref 4.5–5.8)
SODIUM SERPL-SCNC: 134 MEQ/L (ref 136–144)
TROPONIN I SERPL-MCNC: <0.02 NG/ML
WBC # BLD AUTO: 8.89 K/UL (ref 3.8–10.5)

## 2020-08-06 PROCEDURE — 84484 ASSAY OF TROPONIN QUANT: CPT | Performed by: HOSPITALIST

## 2020-08-06 PROCEDURE — 80048 BASIC METABOLIC PNL TOTAL CA: CPT | Performed by: HOSPITALIST

## 2020-08-06 PROCEDURE — 97166 OT EVAL MOD COMPLEX 45 MIN: CPT | Mod: GO

## 2020-08-06 PROCEDURE — 96372 THER/PROPH/DIAG INJ SC/IM: CPT | Mod: 59

## 2020-08-06 PROCEDURE — 85027 COMPLETE CBC AUTOMATED: CPT | Performed by: HOSPITALIST

## 2020-08-06 PROCEDURE — G0378 HOSPITAL OBSERVATION PER HR: HCPCS

## 2020-08-06 PROCEDURE — 36415 COLL VENOUS BLD VENIPUNCTURE: CPT | Performed by: HOSPITALIST

## 2020-08-06 PROCEDURE — 25000000 HC PHARMACY GENERAL: Performed by: NURSE PRACTITIONER

## 2020-08-06 PROCEDURE — 83735 ASSAY OF MAGNESIUM: CPT | Performed by: HOSPITALIST

## 2020-08-06 PROCEDURE — 3E013GC INTRODUCTION OF OTHER THERAPEUTIC SUBSTANCE INTO SUBCUTANEOUS TISSUE, PERCUTANEOUS APPROACH: ICD-10-PCS | Performed by: HOSPITALIST

## 2020-08-06 PROCEDURE — 63600000 HC DRUGS/DETAIL CODE: Performed by: NURSE PRACTITIONER

## 2020-08-06 PROCEDURE — 73560 X-RAY EXAM OF KNEE 1 OR 2: CPT | Mod: 50

## 2020-08-06 PROCEDURE — 94660 CPAP INITIATION&MGMT: CPT

## 2020-08-06 PROCEDURE — 63700000 HC SELF-ADMINISTRABLE DRUG: Performed by: HOSPITALIST

## 2020-08-06 PROCEDURE — 70551 MRI BRAIN STEM W/O DYE: CPT

## 2020-08-06 PROCEDURE — 97162 PT EVAL MOD COMPLEX 30 MIN: CPT | Mod: GP

## 2020-08-06 PROCEDURE — 63600000 HC DRUGS/DETAIL CODE: Performed by: HOSPITALIST

## 2020-08-06 PROCEDURE — 99226 PR SBSQ OBSERVATION CARE/DAY 35 MINUTES: CPT | Performed by: HOSPITALIST

## 2020-08-06 RX ORDER — ACETAMINOPHEN 325 MG/1
650 TABLET ORAL
Status: DISCONTINUED | OUTPATIENT
Start: 2020-08-06 | End: 2020-08-07 | Stop reason: HOSPADM

## 2020-08-06 RX ORDER — ENOXAPARIN SODIUM 100 MG/ML
40 INJECTION SUBCUTANEOUS EVERY 12 HOURS
Status: DISCONTINUED | OUTPATIENT
Start: 2020-08-06 | End: 2020-08-07 | Stop reason: HOSPADM

## 2020-08-06 RX ORDER — LIDOCAINE 560 MG/1
2 PATCH PERCUTANEOUS; TOPICAL; TRANSDERMAL DAILY
Status: DISCONTINUED | OUTPATIENT
Start: 2020-08-06 | End: 2020-08-07 | Stop reason: HOSPADM

## 2020-08-06 RX ORDER — POTASSIUM CHLORIDE 20 MEQ/1
40 TABLET, EXTENDED RELEASE ORAL ONCE
Status: COMPLETED | OUTPATIENT
Start: 2020-08-06 | End: 2020-08-06

## 2020-08-06 RX ORDER — TRIAMCINOLONE ACETONIDE 40 MG/ML
40 INJECTION, SUSPENSION INTRA-ARTICULAR; INTRAMUSCULAR ONCE
Status: COMPLETED | OUTPATIENT
Start: 2020-08-06 | End: 2020-08-06

## 2020-08-06 RX ORDER — LIDOCAINE HYDROCHLORIDE 10 MG/ML
10 INJECTION, SOLUTION EPIDURAL; INFILTRATION; INTRACAUDAL; PERINEURAL ONCE
Status: COMPLETED | OUTPATIENT
Start: 2020-08-06 | End: 2020-08-06

## 2020-08-06 RX ORDER — TRIAMCINOLONE ACETONIDE 40 MG/ML
80 INJECTION, SUSPENSION INTRA-ARTICULAR; INTRAMUSCULAR ONCE
Status: DISCONTINUED | OUTPATIENT
Start: 2020-08-06 | End: 2020-08-06

## 2020-08-06 RX ADMIN — ACETAMINOPHEN 650 MG: 325 TABLET ORAL at 11:02

## 2020-08-06 RX ADMIN — LIDOCAINE 2 PATCH: 246 PATCH TOPICAL at 10:06

## 2020-08-06 RX ADMIN — POTASSIUM CHLORIDE 40 MEQ: 1500 TABLET, EXTENDED RELEASE ORAL at 07:50

## 2020-08-06 RX ADMIN — ACETAMINOPHEN 650 MG: 325 TABLET ORAL at 20:38

## 2020-08-06 RX ADMIN — LISINOPRIL: 20 TABLET ORAL at 15:46

## 2020-08-06 RX ADMIN — LIDOCAINE HYDROCHLORIDE 10 ML: 10 INJECTION, SOLUTION EPIDURAL; INFILTRATION; INTRACAUDAL; PERINEURAL at 18:43

## 2020-08-06 RX ADMIN — ENOXAPARIN SODIUM 40 MG: 40 INJECTION SUBCUTANEOUS at 20:38

## 2020-08-06 RX ADMIN — TRIAMCINOLONE ACETONIDE 40 MG: 40 INJECTION, SUSPENSION INTRA-ARTICULAR; INTRAMUSCULAR at 18:43

## 2020-08-06 ASSESSMENT — COGNITIVE AND FUNCTIONAL STATUS - GENERAL
HELP NEEDED FOR BATHING: 3 - A LITTLE
TOILETING: 3 - A LITTLE
MOVING TO AND FROM BED TO CHAIR: 3 - A LITTLE
HELP NEEDED FOR PERSONAL GROOMING: 4 - NONE
EATING MEALS: 4 - NONE
STANDING UP FROM CHAIR USING ARMS: 3 - A LITTLE
CLIMB 3 TO 5 STEPS WITH RAILING: 1 - TOTAL
AFFECT: WFL
DRESSING REGULAR UPPER BODY CLOTHING: 4 - NONE
DRESSING REGULAR LOWER BODY CLOTHING: 4 - NONE
AFFECT: WFL
WALKING IN HOSPITAL ROOM: 1 - TOTAL

## 2020-08-06 NOTE — PLAN OF CARE
Problem: Adult Inpatient Plan of Care  Goal: Plan of Care Review  Outcome: Progressing  Flowsheets (Taken 8/6/2020 0332)  Outcome Summary: AAOx3; Vitals stable, Cpap on at hs; tele monitor reading NSR/1AVB, No c/o pain, dizziness or sob. Fall precautions in place through shift.

## 2020-08-06 NOTE — PROGRESS NOTES
Assessment & Plan     Subjective     No events     Temp:  [36.6 °C (97.8 °F)-37 °C (98.6 °F)] 36.6 °C (97.8 °F)  Heart Rate:  [70-86] 78  Resp:  [18] 18  BP: (107-142)/(52-96) 142/76  FiO2 (%) (Set):  [21 %] 21 %  I/O last 3 completed shifts:  In: 740 [P.O.:240; I.V.:500]  Out: 450 [Urine:450]  No intake/output data recorded.     Objective  Neurologic Exam     Awake, alert, speech is clear, language intact.     Cranial nerves -pupils are equal, round, reactive to light and accommodation.  Extraocular muscle movements are intact.  Visual fields are full to confrontation bilaterally.  There is no facial asymmetry.  Facial sensation is intact to light touch.  Tongue is midline.     Motor -no drift.  There is no focal weakness with manual motor testing in the arms, hands, legs, feet.     Coordination rapid alternating movements are symmetric.  Finger to nose is intact.     Sensation - intact to light touch, pinprick.     Reflexes -biceps 2/4 bilaterally, knee jerks 2/4 bilaterally, with toes downgoing bilaterally.     Gait - normal on casual walking.    MRI brain 8/6/20 -   Ventricles and sulci are normal in size and configuration.  There is no  restricted diffusion to suggest an acute infarct. There is no intraparenchymal  hemorrhage, midline shift, mass or mass-effect. There is no extra-axial  collection. The sella appears unremarkable. The cerebellar tonsils are normal in  position. Vascular flow-voids are unremarkable. Bone marrow signal is  unremarkable.     IMPRESSION:  No intracranial mass or acute abnormality.    Syncope  Assessment & Plan  Admitted 8/5/2020 with syncope, passed out after having a bowel movement and then taking a shower.   Likely volume depletion.    He does not take aspirin.  He takes multiple blood pressure medicines.    Hypertension  Obstructive sleep apnea    Neurologic exam - no focal findings.     BP was 101/51 at the time of ER presentation which is much lower than usual.    CT head  -no acute findings.    MRI brain - normal.      PLAN     1.) lasix dc'd    2.) to resume amlodipine, lisinopril, hctz, to resume amlopidine as bp allows    3.) cardiology following          Active Problems:    Syncope    EKG, abnormal    Hypertension    Sleep apnea    Electrolyte abnormality

## 2020-08-06 NOTE — CONSULTS
Orthopedic Consult Note    Subjective     Raman Fields is a 50 y.o. male who was admitted for Hypokalemia [E87.6]  Syncope [R55]  Syncope, unspecified syncope type [R55]. Patient was referred by [facility] [place] for management recommendations. Patient admitted for syncope episode and fall. Unable to ambulate for long periods because of severe bilateral knee pain. He states he has several year history of knee pain.  He has had a steroid injection several years ago with pain relief.  He    Pertinent radiology results reviewed..    Medical History:   Past Medical History:   Diagnosis Date   • Hypertension    • Sleep apnea        Surgical History:   Past Surgical History:   Procedure Laterality Date   • KNEE SURGERY         Social History:   Social History     Social History Narrative   • Not on file       Family History:   Family History   Problem Relation Age of Onset   • Colon cancer Biological Mother        Allergies: No known drug allergies    Current Inpatient Medications   Medication Dose Route Frequency Provider Last Rate Last Dose   • acetaminophen (TYLENOL) tablet 650 mg  650 mg oral q6h while awake Enrique Meyer MD   650 mg at 08/06/20 1102   • glucose chewable tablet 16-32 g of dextrose  16-32 g of dextrose oral PRN Bossman Ruano MD        Or   • dextrose 40 % oral gel 15-30 g of dextrose  15-30 g of dextrose oral PRN Bossman Ruano MD        Or   • glucagon (GLUCAGEN) injection 1 mg  1 mg intramuscular PRN Bossman Ruano MD        Or   • dextrose in water injection 12.5 g  25 mL intravenous PRN Bossman Ruano MD       • lidocaine (ASPERCREME) 4 % topical patch 2 patch  2 patch Topical Daily Enrique Meyer MD   2 patch at 08/06/20 1006   • nitroglycerin (NITROSTAT) SL tablet 0.4 mg  0.4 mg sublingual q5 min PRN Bossman Ruano MD            Medication List      ASK your doctor about these medications    amLODIPine 10 mg tablet  Commonly known as:  NORVASC  Take 10 mg by mouth daily.  Dose:  10 mg     furosemide 20  mg tablet  Commonly known as:  LASIX  Take 20 mg by mouth daily.  Dose:  20 mg     lisinopriL-hydrochlorothiazide 20-12.5 mg per tablet  Commonly known as:  PRINZIDE,ZESTORETIC  Take 2 tablets by mouth daily.  Dose:  2 tablet          Review of Systems  Pertinent items are noted in HPI.    Objective   Labs      Imaging  Bilateral knee xray show - Advanced tricompartmental left knee osteoarthritis with a likely joint effusion.  2.  Tricompartmental right knee osteoarthritis, most advanced in the medial compartment    ECG       Physical Exam  Alert & oriented; VSS NAD  Bilateral knee skin intact   Lidoderm patch covering both knees  Tenderness over medial tibial plateau BLE    SILT B/L LE  5/5 DF, PF, and EHL B/L        Assessment   50 y.o. male being consulted for management recommendations bilateral knee pain     Plan     Pain mgmt  DVT ppx per primary  PT/OT WBAT OOB  Further recs once seen by Dr Rivera

## 2020-08-06 NOTE — PROGRESS NOTES
Patient: Raman Fields  Location: [place] [address]  MRN: [id number]  Today's date: 8/6/2020    Pt seated at EOB with clearance from RN at end of session; tray positioned in front of pt in prep for lunch.     Raman GALLEGOS is a 50 y.o. male admitted on 8/5/2020 with Syncope. Principal problem is No Principal Problem: There is no principal problem currently on the Problem List. Please update the Problem List and refresh..    Past Medical History  Raman GALLEGOS has a past medical history of Hypertension and Sleep apnea.    History of Present Illness   syncopal episode with no chest pain. Troponin (-). DDX includes volume depletion. MRI pending       Therapy Pain/Vitals     Row Name 08/06/20 1119 08/06/20 1120       Pain/Comfort/Sleep    Pain Body Location  knee  --    Pain Rating (0-10): Rest  6  --       Vital Signs    Pulse  78  78    Heart Rate Source  Monitor  Monitor    SpO2  99 %  100 %    Patient Activity  At rest  At rest    Oxygen Therapy  None (Room air)  None (Room air)    BP  136/78  138/76    BP Location  Left forearm  Left forearm    BP Method  Automatic  Automatic    Patient Position  Lying  Sitting            Prior Living Environment      Most Recent Value   Lives With  significant other, child(sampson), dependent   Living Arrangements  hotel/motel   Living Environment Comment  with SO, two adult children          Prior Level of Function      Most Recent Value   Dominant Hand  right   Ambulation  assistive equipment   Transferring  assistive equipment   Toileting  assistive equipment   Bathing  assistive equipment   Dressing  independent   Eating  independent   Prior Level of Function Comment  Modified independent with ADL and functional transfers,  pt mostly at w/c level for the past 3 weeks 2/2 progressive bilat LE knee pain. Pt performed squat pivot transfer to/from w/c, bed, toilet and edge of tub. Pt unable to stand 2/2 severe knee pain with attempt. Pt performs light IADL from seated level.    Assistive Device/Animal Currently Used at Home  grab bar, wheelchair          OT Evaluation and Treatment - 08/06/20 1142        Time Calculation    Start Time  1125     Stop Time  1142     Time Calculation (min)  17 min        Session Details    Document Type  initial evaluation     Mode of Treatment  occupational therapy        General Information    Patient Profile Reviewed?  yes     General Observations of Patient  Pt upright in bed cooperative and pleasant with OT.     Existing Precautions/Restrictions  no known precautions/restrictions        Occupational Profile    Environmental Supports and Barriers (Occupational Profile)  pt lives with family (spouse and sons age 18 and 16) who assist with IADL.        Cognition/Psychosocial    Affect/Mental Status (Cognitive)  WFL     Orientation Status (Cognition)  oriented x 4     Follows Commands (Cognition)  WFL        Vision Assessment/Intervention    Visual Impairment/Limitations  WFL        Sensory Assessment (Somatosensory)    Sensory Assessment (Somatosensory)  sensation intact        Range of Motion (ROM)    Range of Motion  bilateral upper extremities;ROM is WFL        Strength (Manual Muscle Testing)    Strength (Manual Muscle Testing)  bilateral upper extremities;strength is WFL    grossly 5/5 strength bilat UE       Bed Mobility    [place], Supine to Sit  modified independence     [place], Sit to Supine  modified independence        Sit to Stand Transfer    [place], Sit to Stand Transfer  supervision     Assistive Device  bariatric;walker, front-wheeled     Comment  completed 3 trials first two partial stands and third complete stand with minimal standing activity tolerance 2/2 bilat knee pain which is baseline per pt report        Stand to Sit Transfer    [place], Stand to Sit Transfer  modified independence     Assistive Device  bariatric;walker, front-wheeled        Balance    Static Sitting Balance  WFL     Dynamic Sitting Balance   WFL     Sit to Stand Dynamic Balance  mild impairment     Comment, Balance  good static and dynamic sitting balance        Lower Body Dressing    Self-Performance  dons/doffs left sock;dons/doffs right sock;threads left leg, pants/shorts;threads right leg, pants/shorts;pulls pants/shorts up or down     [place]  modified independence     Position  edge of bed sitting     Comment  completed with distal LE support on EOB and performed simulated short donning on hips seated with hiking one hip up at a time (laterally EOB)        Grooming    Self-Performance  brushes/jacobsen hair     [place]  modified independence     Position  edge of bed sitting        AM-PAC (TM) - ADL (Current Function)    Putting on and taking off regular lower body clothing?  4 - None     Bathing?  3 - A Little     Toileting?  3 - A Little     Putting on/taking off regular upper body clothing?  4 - None     How much help for taking care of personal grooming?  4 - None     Eating meals?  4 - None     AM-PAC (TM) ADL Score  22        Therapy Assessment/Plan (OT)    Therapy Frequency (OT)  evaluation only        Progress Summary (OT)    Daily Outcome Statement (OT)  OT evaluation completed. Pt was mod I for seated ADL and functional bed mobility; pt S to mod I for sit/stand transfers. Pt reported this has been baseline performance for him 2/2 progressive bilat LE knee pain with inability to tolerate sustained standing (pt has been at w/c level for the past 3 weeks per pt).      Symptoms Noted During/After Treatment  none        Therapy Plan Review/Discharge Plan (OT)    OT Recommended Discharge Disposition  home with assist     Anticipated Equipment Needs At Discharge (OT)  none    pt has GB and w/c                  Education provided this session. See the Patient Education summary report for full details.

## 2020-08-06 NOTE — PROGRESS NOTES
Hospital Medicine Service -  Daily Progress Note       SUBJECTIVE   Patient seen and examined earlier.  Does not feel dizzy or lightheaded while laying in bed.  States he has severe pain in his knees which is prevented him from walking for the last 3 months.  Patient has become homeless in the COVID pandemic, lost his job and has been unable to collect unemployment, living in a motel with his wife.  Denies cough, shortness of breath.    Describes the 2 episodes as starting with some itching in his left shoulder and upper back, dizzy and then some funny sensation in his left arm.  Last week he had an episode like this but he did not pass out, yesterday he passed out after getting out of the shower.    ROS negative other than noted above.   OBJECTIVE      Vital signs in last 24 hours:  Temp:  [36.6 °C (97.8 °F)-37 °C (98.6 °F)] 36.6 °C (97.8 °F)  Heart Rate:  [70-86] 78  Resp:  [18] 18  BP: (107-142)/(52-96) 142/76  FiO2 (%) (Set):  [21 %] 21 %  No intake or output data in the 24 hours ending 08/06/20 1531    PHYSICAL EXAMINATION      Physical Exam   Gen:  Well developed and nourished, in NAD  HEENT:NCAT, EOMI, Hearing Intact, Nasal Passages Clear, Moist Oral Mucosa  Neck:  FROM, Supple  Lungs: CTA B,  No RRW, no accessory muscle use, good air exchange  CVS: S1 S2 RRR, no mrg  Abd:  soft, NT no rebound/guarding, BS +  Msk: FROM in all joints  Neuro: A&O x 3, no focal deficits, no sensory deficits  Psyhe: Pleasant and cooperative, normal affect  Skin: No open cuts/sores/wounds  Ext: No edema x 2       LINES, CATHETERS, DRAINS, AIRWAYS, AND WOUNDS   Lines, Drains, Airways, Wounds:  Peripheral IV 08/05/20 Left Hand (Active)   Number of days: 1       Comments:      LABS / IMAGING / TELE      I have reviewed all labs and imaging results. Please see Problem List/A/P for relevant findings.      ASSESSMENT AND PLAN      * Syncope  Assessment & Plan  Patient dated on 8/5/2020 had an episode of syncope, patient reports he  had a similar episode 1 week ago  uncertain etiology-vasovagal?  Versus dehydration as his blood pressure was noted to be on the lower side  Monitor without evidence of malignant arrhythmia and troponins negative  Neurology recommended MRI which was done this morning without any acute abnormalities  Blood pressure is being adjusted-the hypertension    Hypertension  Assessment & Plan  Takes lisinopril/hct 20/12.5 2 tabs daily, lasix 20 and norvasc 10  Low normal blood pressure on presentation -101/50 in the emergency room  Received a 500 cc bolus and blood pressure this morning 130/70  Orthostatics limited as he is unable to stand due to his DJD in his knees, laying to sitting orthostatic measurements negative  Blood pressure medications held initially-will resume lisinopril/HCT today as per recommendation of cardiology  Monitor blood pressure, if remains stable possible discharge in a.m. on adjusted regimen    Osteoarthritis of both knees  Assessment & Plan  Severe debilitating pain in both knees  Patient states that he is now wheelchair-bound for the last 3 months because he cannot stand  Patient does not take any medications because he cannot afford them  Have started Tylenol around-the-clock and Lidoderm patches  X-ray of both knees show evidence of tricompartmental osteoarthritis, left knee with at least a moderate effusion  Ortho consult?  Aspiration and injection    Hypokalemia  Assessment & Plan  Monitor and replete prn    Sleep apnea  Assessment & Plan  Patient states he stopped using his CPAP machine a few months ago because he feels that it is not working any longer  He follows with neurology at [facility]  Suggested that he follow-up with them to discuss having machine adjusted    EKG, abnormal  Assessment & Plan  Junctional rhythm with syncope  Monitor on telemetry  Troponins negative  Appreciate cards input-EKG felt to be sinus rhythm  No further cardiac workup at this time       VTE Assessment:  Padua VTE Score: 4  VTE Prophylaxis Plan: lovenox  Code Status: Full Code  Estimated Discharge Date: 8/7/2020  Disposition Planning: return home if BP remains stable with resumption of bp meds     Enrique Meyer MD  8/6/2020

## 2020-08-06 NOTE — PLAN OF CARE
Problem: Adult Inpatient Plan of Care  Goal: Plan of Care Review  Outcome: Progressing  Flowsheets (Taken 8/6/2020 9605)  Plan of Care Reviewed With: patient  Outcome Summary: OT evaluation completed. Pt was mod I for seated ADL and functional bed mobility; pt S to mod I for sit/stand transfers. Pt reported this has been baseline performance for him 2/2 progressive bilat LE knee pain with inability to tolerate sustained standing (pt has been at w/c level for the past 3 weeks per pt).

## 2020-08-06 NOTE — PLAN OF CARE
Problem: Adult Inpatient Plan of Care  Goal: Plan of Care Review  Flowsheets (Taken 2020 1559)  Progress: improving  Plan of Care Reviewed With: patient  Outcome Summary: Pt discussed in Care Progression Rounds. Pt is agreeable to plan of care.     Problem: Adult Inpatient Plan of Care  Goal: Readiness for Transition of Care  Intervention: Mutually Develop Transition Plan  Flowsheets (Taken 2020 1600)  Anticipated Discharge Disposition: home without services  Equipment Needed After Discharge: none  Assistive Device/Animal Currently Used at Home: wheelchair; grab bar  Anticipated Changes Related to Illness: none  Transportation Concerns: car, none  Readmission Within the Last 30 Days: no previous admission in last 30 days  Patient/Family Anticipated Services at Transition: none  Patient/Family Anticipates Transition to: home; home with family  Transportation Anticipated: family or friend will provide  Concerns to be Addressed: homelessness   Discharge Coordination/Progress Note:  Pt discussed in Care Progression Rounds. Met with patient at bedside to discuss discharge planning. Explained role of Care Coordination Team. Verified , ID and demographic information. Pt states his PCP is   at [facility] in [place], [place].      Living Arrangements: Pt is currently homeless since being unemployed since pandemic started. Pt states he lives with his berta and 2 sons currently at [address], [place] [address]. Pt states he pretty much has been either lying in bed or in a wheelchair. Pt states that he has arthritic knees and has been having difficulty with ambulating. Berta assists with care.        Home Care/ Current Needs: No history of any HC services in the past.    Pharmacy: [facility] in [place], [place]      Anticipated Discharge Needs/Disposition:Plan is home without services. CC will continue to follow.           Signed by:

## 2020-08-06 NOTE — PATIENT CARE CONFERENCE
Care Progression Rounds Note  Date: 8/6/2020  Time: 3:00 PM     Patient Name: Raman Fields     Medical Record Number: [id number]   YOB: 1970  Sex: Male      Room/Bed: 2313    Admitting Diagnosis: Hypokalemia [E87.6]  Syncope [R55]  Syncope, unspecified syncope type [R55]   Admit Date/Time: 8/5/2020 11:05 AM    Primary Diagnosis: No Principal Problem: There is no principal problem currently on the Problem List. Please update the Problem List and refresh.  Principal Problem: No Principal Problem: There is no principal problem currently on the Problem List. Please update the Problem List and refresh.    GMLOS: pending  Anticipated Discharge Date: 8/6/2020    AM-PAC  Mobility Score: 16    Discharge Planning:  Living Arrangements: hotel/motel    Barriers to Discharge:  Barriers to Discharge: Other (see comments)  Comment: per cards d/c lasiix    Participants:  nursing

## 2020-08-06 NOTE — ASSESSMENT & PLAN NOTE
EKG interpretation of accelerated junctional rhythm which I do not concur with.  His EKG in my opinion shows sinus rhythm.

## 2020-08-06 NOTE — PROGRESS NOTES
Patient: Raman Fields  Location: [place] [address]  MRN: [id number]  Today's date: 8/6/2020    End of Tx session patient seated EOB with call bell and all needs within reach. Requested needs met. Bed alarm engaged. RN informed    Raman GALLEGOS is a 50 y.o. male admitted on 8/5/2020 with Syncope. Principal problem is No Principal Problem: There is no principal problem currently on the Problem List. Please update the Problem List and refresh..    Past Medical History  Raman GALLEGOS has a past medical history of Hypertension and Sleep apnea.    History of Present Illness   syncopal episode with no chest pain. Troponin (-). DDX includes volume depletion. MRI pending    PT Vitals    Date/Time Pulse HR Source SpO2 Pt Activity O2 Therapy BP BP Location BP Method Pt Position [facility]   08/06/20 1119 78 Monitor 99 % At rest None (Room air) 136/78 Left forearm Automatic Lying ML   08/06/20 1120 78 Monitor 100 % At rest None (Room air) 138/76 Left forearm Automatic Sitting ML      PT Pain    Date/Time Location Rating: [facility]   08/06/20 1119 knee 6 ML          Prior Living Environment      Most Recent Value   Lives With  significant other, child(sampson), dependent   Living Arrangements  hotel/motel   Living Environment Comment  with SO, two adult children          Prior Level of Function      Most Recent Value   Ambulation  assistive equipment   Transferring  assistive equipment   Toileting  assistive equipment   Bathing  assistive equipment   Dressing  independent   Assistive Device/Animal Currently Used at Home  grab bar, wheelchair          PT Evaluation and Treatment - 08/06/20 1141        Time Calculation    Start Time  1119     Stop Time  1141     Time Calculation (min)  22 min        Session Details    Document Type  initial evaluation     Mode of Treatment  physical therapy        General Information    Patient Profile Reviewed?  yes     Referring Physician  Santoshogal     General Observations of Patient  Patient  received supine, pleasant. Agreeable to PT Eval      Existing Precautions/Restrictions  no known precautions/restrictions        Cognition/Psychosocial    Affect/Mental Status (Cognitive)  WFL     Orientation Status (Cognition)  oriented x 3     Follows Commands (Cognition)  WFL        Sensory Assessment (Somatosensory)    Sensory Assessment (Somatosensory)  left LE;right LE;sensation intact     Left LE Sensory Assessment  deep pressure awareness;light touch awareness     Right LE Sensory Assessment  deep pressure awareness;light touch awareness;intact        Range of Motion (ROM)    Range of Motion  left lower extremity ROM deficit;right lower extremity ROM deficit     Left Lower Extremity (ROM)  --     Hip, Left (ROM)  50% due to body habitus     Knee, Left (ROM)  50% due to pain     Right Lower Extremity (ROM)  --     Hip, Right (ROM)  50% due to body habitus     Knee, Right (ROM)  50% due to pain        Strength (Manual Muscle Testing)    Strength (Manual Muscle Testing)  --     Left Lower Extremity Strength  hip;knee;ankle     Hip, Left (Strength)  2-     Knee, Left (Strength)  2-     Ankle, Left (Strength)  3     Right Lower Extremity Strength  hip;knee;ankle     Hip, Right (Strength)  2-     Knee, Right (Strength)  2-     Ankle, Right (Strength)  3        Strength Comprehensive (MMT)    Comprehensive MMT Assessment  --        Bed Mobility    [place], Supine to Sit  modified independence     [place], Sit to Supine  modified independence     Assistive Device (Bed Mobility)  bed rails;head of bed elevated     Comment (Bed Mobility)  Requires increased time to complete. Able to bring BLE towards EOB and utilize bed rail from sidelying position to bring trunk into upright sitting        Sit to Stand Transfer    [place], Sit to Stand Transfer  supervision     Assistive Device  bariatric;walker, front-wheeled     Comment  Requires 3 attempts. Able to achieve 1 complete stand but not able to complete  further mobility due to pain in bilateral knees        Stand to Sit Transfer    [place], Stand to Sit Transfer  modified independence     Assistive Device  bariatric;walker, front-wheeled        Gait Training    [place], Gait  unable to assess     Comment  pain, safety        Stairs Training    [place], Stairs  unable to assess        Safety Issues, Functional Mobility    Impairments Affecting Function (Mobility)  balance;endurance/activity tolerance;pain;range of motion;strength        Balance    Balance Assessment  sitting static balance;sitting dynamic balance;sit to stand dynamic balance     Static Sitting Balance  WFL;unsupported;sitting, edge of bed     Dynamic Sitting Balance  WFL;unsupported;sitting, edge of bed     Sit to Stand Dynamic Balance  mild impairment        AM-PAC (TM) - Mobility (Current Function)    Turning from your back to your side while in a flat bed without using bedrails?  4 - None     Moving from lying on your back to sitting on the side of a flat bed without using bedrails?  4 - None     Moving to and from a bed to a chair?  3 - A Little     Standing up from a chair using your arms?  3 - A Little     To walk in a hospital room?  1 - Total     Climbing 3-5 steps with a railing?  1 - Total     AM-PAC (TM) Mobility Score  16        Therapy Assessment/Plan (PT)    Therapy Frequency (PT)  evaluation only        Progress Summary (PT)    Daily Outcome Statement (PT)  Ray presents at baseline LOF, currently unable to assess ambulation as a result of pain, BLE weakness. Per patient report, PLOF includes only transfers to , SPT to toilet and tub for bathing. Patient at baseline LOF at this time. Rec home when appropriate.      Symptoms Noted During/After Treatment  none        Therapy Plan Review/Discharge Plan (PT)    PT Recommended Discharge Disposition  home     Anticipated Equipment Needs at Discharge (PT Eval)  none                       Education provided this session. See  the Patient Education summary report for full details.

## 2020-08-06 NOTE — HOSPITAL COURSE
Raman GALLEGOS is a 50 y.o. male admitted on 8/5/2020 with Syncope. Principal problem is No Principal Problem: There is no principal problem currently on the Problem List. Please update the Problem List and refresh..    Past Medical History  Raman GALLEGOS has a past medical history of Hypertension and Sleep apnea.    History of Present Illness   syncopal episode with no chest pain. Troponin (-). DDX includes volume depletion. MRI pending

## 2020-08-06 NOTE — ASSESSMENT & PLAN NOTE
Severe debilitating pain in both knees  Patient states that he is now wheelchair-bound for the last 3 months because he cannot stand  Patient does not take any medications because he cannot afford them  Have started Tylenol around-the-clock and Lidoderm patches  X-ray of both knees show evidence of tricompartmental osteoarthritis, left knee with at least a moderate effusion  Steroid injection of the left knee-appreciate Ortho help  recommend patient continue to use Tylenol as outpatient

## 2020-08-06 NOTE — PROGRESS NOTES
CARDIOLOGY DAILY PROGRESS NOTE    Raman Fields is a 50 y.o. male who was admitted for Hypokalemia [E87.6]  Syncope [R55]  Syncope, unspecified syncope type [R55].    INTERVAL HISTORY:  No events overnight.  Denies any new complaints.  Severe pain in his bilateral knees leading him to be primarily non ambulatory starting 3 weeks ago.  No chest pain, palpitations, shortness of breath, dizziness.  No further arm numbness or pain.    CURRENT IP MEDS:  • acetaminophen  650 mg oral q6h while awake   • lidocaine  2 patch Topical Daily         Objective     Vital signs in last 24 hours:  Temp:  [36.6 °C (97.8 °F)-37 °C (98.6 °F)] 36.6 °C (97.8 °F)  Heart Rate:  [70-86] 78  Resp:  [18] 18  BP: (107-142)/(52-96) 142/76  FiO2 (%) (Set):  [21 %] 21 %      Intake/Output Summary (Last 24 hours) at 8/6/2020 1305  Last data filed at 8/5/2020 1415  Gross per 24 hour   Intake 740 ml   Output 450 ml   Net 290 ml       WEIGHTS  Wt Readings from Last 3 Encounters:   08/05/20 (!) 216 kg (476 lb)   07/08/20 (!) 226 kg (498 lb)       PHYSICAL EXAM:  Physical Exam   Constitutional: He is oriented to person, place, and time. He appears well-developed and well-nourished. No distress.   HENT:   Head: Normocephalic and atraumatic.   Eyes: Conjunctivae are normal. No scleral icterus.   Neck: Neck supple. No JVD present. Carotid bruit is not present. No thyromegaly present.   Cardiovascular: Normal rate, regular rhythm, normal heart sounds and intact distal pulses. Exam reveals no gallop and no friction rub.   No murmur heard.  Pulmonary/Chest: Effort normal. No respiratory distress.   Abdominal: Soft. Bowel sounds are normal.   Musculoskeletal: Normal range of motion. He exhibits no edema or tenderness.   Neurological: He is alert and oriented to person, place, and time.   Skin: Skin is warm and dry.   Psychiatric: His behavior is normal.       LABS:   Results from last 7 days   Lab Units 08/06/20  0413 08/05/20  1126   SODIUM mEQ/L 134*  133*   POTASSIUM mEQ/L 3.4* 2.9*   CHLORIDE mEQ/L 99 96*   CO2 mEQ/L 25 23   BUN mg/dL 12 11   CREATININE mg/dL 1.1 1.5*   GLUCOSE mg/dL 98 172*   CALCIUM mg/dL 9.0 9.3       Results from last 7 days   Lab Units 08/05/20  1126   AST IU/L 20     Results from last 7 days   Lab Units 08/05/20  1126   ALT IU/L 15*     Results from last 7 days   Lab Units 08/06/20  0413 08/05/20  1126   WBC K/uL 8.89 6.64   HEMOGLOBIN g/dL 13.7 14.8   HEMATOCRIT % 40.2 43.1   PLATELETS K/uL 352* 346       Results from last 7 days   Lab Units 08/06/20  0019 08/05/20  1800 08/05/20  1126   TROPONIN I ng/mL <0.02 <0.02 <0.02     PT/PTT Results     No lab values to display.        Lab Results   Component Value Date    CHOL 166 02/14/2017    TRIG 77 02/14/2017    HDL 27 (L) 02/14/2017    LDLCALC 124 (H) 02/14/2017    DDIMER  02/13/2017     <0.27  The D-Dimer Assay can be used as an aid in the diagnosis of DVT / PE.  The test cannot be used by itself to exclude DVT or PE.  When used as a diagnostic aid, the cutoff value is the same as the reference range: <0.5 UG/ML FEU.         Imaging  Ct Head Without Iv Contrast    Result Date: 8/5/2020  IMPRESSION: No acute intracranial abnormality.    Mri Brain Without Contrast    Result Date: 8/6/2020  IMPRESSION: No intracranial mass or acute abnormality.    X-ray Chest 1 View    Result Date: 8/5/2020  IMPRESSION: No active disease in the chest           ECG   No new ECG.    TELEMETRY      Normal sinus rhythm    Assessment/Plan   Syncope  Assessment & Plan  Secondary to hypotension from volume depletion.  Volume and electrolye replacement.  With keep off of furosemide.  Okay to continue hydrochlorothiazide for hypertension.    Manage lower extremity edema with compression stockings.    Electrolyte abnormality  Assessment & Plan  Induced by multiple diuretics.  Replace K to above 3.5.    Sleep apnea  Assessment & Plan  Recent noncompliance with CPAP.  Resume therapy.  Patient may need readjustments in  "settings given \"choking\" with his CPAP.    Hypertension  Assessment & Plan  History of hypertension.  BP today much improved with volume replacement.  Hold lasix   Resume HCTZ, lisinopril.  Resume amlodipine as BP allows.    EKG, abnormal  Assessment & Plan    EKG interpretation of accelerated junctional rhythm which I do not concur with.  His EKG in my opinion shows sinus rhythm.             Cat Mcknight, DO  8/6/2020    "

## 2020-08-06 NOTE — PATIENT CARE CONFERENCE
Care Progression Rounds Note  Date: 8/6/2020  Time: 10:01 AM     Patient Name: Raman Fields     Medical Record Number: [id number]   YOB: 1970  Sex: Male      Room/Bed: 2313    Admitting Diagnosis: Hypokalemia [E87.6]  Syncope [R55]  Syncope, unspecified syncope type [R55]   Admit Date/Time: 8/5/2020 11:05 AM    Primary Diagnosis: No Principal Problem: There is no principal problem currently on the Problem List. Please update the Problem List and refresh.  Principal Problem: No Principal Problem: There is no principal problem currently on the Problem List. Please update the Problem List and refresh.    GMLOS: pending  Anticipated Discharge Date: 8/6/2020    AM-PAC  Mobility Score: 12    Discharge Planning:       Barriers to Discharge:  Barriers to Discharge: Medical issues not resolved, Test pending  Comment: MRI pending; w/c bound; near syncope ; poss d/c if MRI neg;     Participants:  nursing, , social work/services

## 2020-08-06 NOTE — PLAN OF CARE
Problem: Adult Inpatient Plan of Care  Goal: Plan of Care Review  Flowsheets (Taken 8/6/2020 1217)  Progress: improving  Plan of Care Reviewed With: patient  Outcome Summary: PT Eval complete. modified independent in bed mobility, transfers with RW at this time as a result of weakness, BLE pain. Unable to maintain standing for orthostatic BP reading due to pain in knees. Denies dizziness throughout session. No acute care PT needs at this time. Rec home when appropriate.

## 2020-08-07 VITALS
HEIGHT: 73 IN | SYSTOLIC BLOOD PRESSURE: 132 MMHG | DIASTOLIC BLOOD PRESSURE: 77 MMHG | TEMPERATURE: 97.5 F | RESPIRATION RATE: 18 BRPM | BODY MASS INDEX: 41.75 KG/M2 | WEIGHT: 315 LBS | HEART RATE: 87 BPM | OXYGEN SATURATION: 95 %

## 2020-08-07 LAB
ANION GAP SERPL CALC-SCNC: 13 MEQ/L (ref 3–15)
BUN SERPL-MCNC: 13 MG/DL (ref 8–20)
CALCIUM SERPL-MCNC: 9.3 MG/DL (ref 8.9–10.3)
CHLORIDE SERPL-SCNC: 94 MEQ/L (ref 98–109)
CO2 SERPL-SCNC: 23 MEQ/L (ref 22–32)
CREAT SERPL-MCNC: 1.2 MG/DL (ref 0.8–1.3)
GFR SERPL CREATININE-BSD FRML MDRD: >60 ML/MIN/1.73M*2
GLUCOSE SERPL-MCNC: 222 MG/DL (ref 70–99)
MAGNESIUM SERPL-MCNC: 1.9 MG/DL (ref 1.8–2.5)
POTASSIUM SERPL-SCNC: 4 MEQ/L (ref 3.6–5.1)
SODIUM SERPL-SCNC: 130 MEQ/L (ref 136–144)

## 2020-08-07 PROCEDURE — 96372 THER/PROPH/DIAG INJ SC/IM: CPT | Mod: 59

## 2020-08-07 PROCEDURE — 36415 COLL VENOUS BLD VENIPUNCTURE: CPT | Performed by: HOSPITALIST

## 2020-08-07 PROCEDURE — 3E013GC INTRODUCTION OF OTHER THERAPEUTIC SUBSTANCE INTO SUBCUTANEOUS TISSUE, PERCUTANEOUS APPROACH: ICD-10-PCS | Performed by: HOSPITALIST

## 2020-08-07 PROCEDURE — 63700000 HC SELF-ADMINISTRABLE DRUG: Performed by: HOSPITALIST

## 2020-08-07 PROCEDURE — 80048 BASIC METABOLIC PNL TOTAL CA: CPT | Performed by: HOSPITALIST

## 2020-08-07 PROCEDURE — 99217 PR OBSERVATION CARE DISCHARGE MANAGEMENT: CPT | Performed by: HOSPITALIST

## 2020-08-07 PROCEDURE — 63600000 HC DRUGS/DETAIL CODE: Performed by: HOSPITALIST

## 2020-08-07 PROCEDURE — 94660 CPAP INITIATION&MGMT: CPT

## 2020-08-07 PROCEDURE — 83735 ASSAY OF MAGNESIUM: CPT | Performed by: HOSPITALIST

## 2020-08-07 PROCEDURE — G0378 HOSPITAL OBSERVATION PER HR: HCPCS

## 2020-08-07 RX ORDER — LIDOCAINE 560 MG/1
2 PATCH PERCUTANEOUS; TOPICAL; TRANSDERMAL DAILY
Qty: 60 PATCH | Refills: 0 | Status: SHIPPED | OUTPATIENT
Start: 2020-08-08 | End: 2021-03-10

## 2020-08-07 RX ORDER — AMLODIPINE BESYLATE 10 MG/1
10 TABLET ORAL DAILY
Qty: 30 TABLET | Refills: 0 | Status: SHIPPED | OUTPATIENT
Start: 2020-08-07 | End: 2021-03-10

## 2020-08-07 RX ORDER — ACETAMINOPHEN 325 MG/1
650 TABLET ORAL
Qty: 240 TABLET | Refills: 0 | Status: SHIPPED | OUTPATIENT
Start: 2020-08-07 | End: 2020-09-06

## 2020-08-07 RX ORDER — LISINOPRIL AND HYDROCHLOROTHIAZIDE 12.5; 2 MG/1; MG/1
2 TABLET ORAL DAILY
Qty: 60 TABLET | Refills: 0 | Status: SHIPPED | OUTPATIENT
Start: 2020-08-07 | End: 2021-03-10

## 2020-08-07 RX ORDER — AMLODIPINE BESYLATE 10 MG/1
10 TABLET ORAL DAILY
Status: DISCONTINUED | OUTPATIENT
Start: 2020-08-07 | End: 2020-08-07 | Stop reason: HOSPADM

## 2020-08-07 RX ADMIN — AMLODIPINE BESYLATE 10 MG: 10 TABLET ORAL at 09:17

## 2020-08-07 RX ADMIN — ENOXAPARIN SODIUM 40 MG: 40 INJECTION SUBCUTANEOUS at 08:44

## 2020-08-07 RX ADMIN — LISINOPRIL: 20 TABLET ORAL at 09:08

## 2020-08-07 RX ADMIN — LISINOPRIL: 20 TABLET ORAL at 08:45

## 2020-08-07 RX ADMIN — ACETAMINOPHEN 650 MG: 325 TABLET ORAL at 08:43

## 2020-08-07 RX ADMIN — LIDOCAINE 2 PATCH: 246 PATCH TOPICAL at 08:45

## 2020-08-07 NOTE — DISCHARGE INSTRUCTIONS
You were admitted to the hospital because she had an episode of passing out.  This was most likely caused by dehydration and low blood pressure.  You have had some adjustments made to blood pressure medicine.  Please see the discharge medication list and take the medications as prescribed.    Please monitor your blood pressure as you are able.  Follow-up with your primary care doctor, telemetry visit is okay within 1 week.  Ideally would have your labs drawn in 1 week as well, I have given you a prescription for that.    If you have chest pain or another episode of passing out, please return to the emergency room.    Please use tylenol (acetaminopen) for the knee pain. You had steroid injection to the left knee which should help you pain as well.    Thanks  Enrique Meyer MD

## 2020-08-07 NOTE — PROGRESS NOTES
Assessment & Plan     Subjective     No events     Temp:  [36.3 °C (97.4 °F)-36.9 °C (98.4 °F)] 36.3 °C (97.4 °F)  Heart Rate:  [74-95] 91  Resp:  [18] 18  BP: (121-160)/(52-93) 160/93  FiO2 (%) (Set):  [21 %] 21 %  I/O last 3 completed shifts:  In: -   Out: 900 [Urine:900]  No intake/output data recorded.     Objective  Neurologic Exam     Awake, alert, speech is clear, language intact.     Cranial nerves -pupils are equal, round, reactive to light and accommodation.  Extraocular muscle movements are intact.  Visual fields are full to confrontation bilaterally.  There is no facial asymmetry.  Facial sensation is intact to light touch.  Tongue is midline.     Motor -no drift.  There is no focal weakness with manual motor testing in the arms, hands, legs, feet.     Coordination rapid alternating movements are symmetric.  Finger to nose is intact.     Sensation - intact to light touch, pinprick.     Reflexes -biceps 2/4 bilaterally, knee jerks 2/4 bilaterally, with toes downgoing bilaterally.     Gait - normal on casual walking.    MRI brain 8/6/20 -   Ventricles and sulci are normal in size and configuration.  There is no  restricted diffusion to suggest an acute infarct. There is no intraparenchymal  hemorrhage, midline shift, mass or mass-effect. There is no extra-axial  collection. The sella appears unremarkable. The cerebellar tonsils are normal in  position. Vascular flow-voids are unremarkable. Bone marrow signal is  unremarkable.     IMPRESSION:  No intracranial mass or acute abnormality.    * Syncope  Assessment & Plan  Admitted 8/5/2020 with syncope, passed out after having a bowel movement and then taking a shower.   Likely volume depletion.    He does not take aspirin.  He takes multiple blood pressure medicines.    Hypertension  Obstructive sleep apnea    Neurologic exam - no focal findings.     BP was 101/51 at the time of ER presentation which is much lower than usual.    CT head -no acute  findings.    MRI brain - normal.      PLAN     1.) lasix dc'd    2.) resumed  Lisinopril, to resume amlopidine as bp allows    3.) cardiology following

## 2020-08-07 NOTE — DISCHARGE SUMMARY
Hospital Medicine Service -  Inpatient Discharge Summary        BRIEF OVERVIEW   Admitting Provider: Bossman Ruano MD  Attending Provider: Enrique Meyer MD Attending phys phone: [phone]    PCP: Raul Raman, No Pcp [phone]    Admission Date: 8/5/2020  Discharge Date: 8/7/2020     DISCHARGE DIAGNOSES      Primary Discharge Diagnosis  Syncope    Secondary Discharge Diagnoses  Active Hospital Problems    Diagnosis Date Noted   • Syncope 08/05/2020     Priority: High   • Hypertension 08/05/2020     Priority: Medium   • Class 3 severe obesity in adult (CMS/HCC) 08/06/2020   • Osteoarthritis of both knees 08/06/2020   • EKG, abnormal 08/05/2020   • Sleep apnea 08/05/2020   • Hypokalemia 08/05/2020      Resolved Hospital Problems   No resolved problems to display.       Problem List on Day of Discharge  * Syncope  Assessment & Plan  Patient dated on 8/5/2020 had an episode of syncope, patient reports he had a similar episode 1 week ago  uncertain etiology-vasovagal?  Versus dehydration as his blood pressure was noted to be on the lower side  Monitor without evidence of malignant arrhythmia and troponins negative  Neurology recommended MRI which was done this morning without any acute abnormalities  Blood pressure is being adjusted-the hypertension    Hypertension  Assessment & Plan  Takes lisinopril/hct 20/12.5 2 tabs daily, lasix 20 and norvasc 10  Low normal blood pressure on presentation -101/50 in the emergency room  Received a 500 cc bolus and blood pressure this morning 130/70  Orthostatics limited as he is unable to stand due to his DJD in his knees, laying to sitting orthostatic measurements negative  Blood pressure medications held initially-will resume lisinopril/HCT today as per recommendation of cardiology  Monitor blood pressure, if remains stable possible discharge in a.m. on adjusted regimen    Osteoarthritis of both knees  Assessment & Plan  Severe debilitating pain in both knees  Patient  states that he is now wheelchair-bound for the last 3 months because he cannot stand  Patient does not take any medications because he cannot afford them  Have started Tylenol around-the-clock and Lidoderm patches  X-ray of both knees show evidence of tricompartmental osteoarthritis, left knee with at least a moderate effusion  Ortho consult?  Aspiration and injection    Hypokalemia  Assessment & Plan  Monitor and replete prn    Sleep apnea  Assessment & Plan  Patient states he stopped using his CPAP machine a few months ago because he feels that it is not working any longer  He follows with neurology at [facility]  Suggested that he follow-up with them to discuss having machine adjusted    EKG, abnormal  Assessment & Plan  Junctional rhythm with syncope  Monitor on telemetry  Troponins negative  Appreciate cards input-EKG felt to be sinus rhythm  No further cardiac workup at this time      SUMMARY OF HOSPITALIZATION      Presenting Problem/History of Present Illness  Syncope    Hospital Course  This is a 50-year-old male with a history of obesity, ALYSSA- noncompliant with CPAP recently, hypertension and severe arthritis of his knees.  The patient came to the emergency room on 8/5/2020 after he had a syncopal episode -episode occurred after he took a shower, started out with dizziness weakness in his left arm felt weak along with some itching in the left shoulder and subsequently passed out. States he had similar symptoms last week but did not pass out.  Patient's blood pressure was initially lower he was given a 500 bolus in the emergency room. His blood pressure meds were initially held.  He was evaluated by cardiology who did not felt that he had a cardiac cause syncope, his monitor did not show any significant arrhythmias, orthostatics were done from laying to sitting due to his inability to stand due to knee pain and these were negative.  We resumed his blood pressure medicines actually as his blood pressure  was high on the day of discharge, however it was recommended that he stay off of his Lasix this is probably contributed to his dehydration and syncope.  It appears that he was started on Lasix due to lower extremity edema which she did not have while here in the hospital.  Patient was also seen by neurology and he had a negative MRI of his brain, there was no evidence of cerebral ischemia.    Raman has had severe debilitating knee pain which seems to have been worse since he is no longer working, x-rays confirm severe arthritis in both knees, he was evaluated Ortho and he received a steroid injection into the left knee.    Of note, the patient is usually employed as a  and also drives a school bus however he has been unemployed since the COVID pandemic, states that he has been unable to collect unemployment from the government so far, he has now become homeless and is living with his wife in a motel.  States that he has been taking his medications, social work recommended that he have his medications delivered to him from the pharmacy so that he would not have to pay for transportation to and from there.  Social work confirmed that patient still was covered with medical insurance from his employer. Social situation is obviously less than ideal but could not offer him any further assistance in this regard.    Please see problem list for further details related to hospitalization.    Exam on Day of Discharge  Physical Exam   Gen:    Well developed and nourished, in NAD  HEENT:NCAT, EOMI, Hearing Intact, Nasal Passages Clear, Moist Oral Mucosa  Neck:           FROM, Supple  Lungs: CTA B,  No RRW, no accessory muscle use, good air exchange  CVS:   S1 S2 RRR, no mrg  Abd:    soft, NT no rebound/guarding, BS +  Msk:    FROM in all joints  Neuro: A&O x 3, no focal deficits, no sensory deficits  Psyhe:           Pleasant and cooperative, normal affect  Skin:   No open cuts/sores/wounds  Ext:     No edema x  2       Consults During Admission  IP CONSULT TO CARDIOLOGY  IP CONSULT TO NEUROLOGY  IP CONSULT TO ORTHOPEDIC SURGERY    DISCHARGE MEDICATIONS        Medication List      ASK your doctor about these medications    amLODIPine 10 mg tablet  Commonly known as:  NORVASC  Take 10 mg by mouth daily.  Dose:  10 mg     furosemide 20 mg tablet  Commonly known as:  LASIX  Take 20 mg by mouth daily.  Dose:  20 mg     lisinopriL-hydrochlorothiazide 20-12.5 mg per tablet  Commonly known as:  PRINZIDE,ZESTORETIC  Take 2 tablets by mouth daily.  Dose:  2 tablet               PROCEDURES / LABS / IMAGING        Pertinent Imaging  Ct Head Without Iv Contrast    Result Date: 8/5/2020  IMPRESSION: No acute intracranial abnormality.    Mri Brain Without Contrast    Result Date: 8/6/2020  IMPRESSION: No intracranial mass or acute abnormality.    X-ray Chest 1 View    Result Date: 8/5/2020  IMPRESSION: No active disease in the chest    X-ray Knee Bilateral 1 Or 2 Views    Result Date: 8/6/2020  IMPRESSION: 1.  Advanced tricompartmental left knee osteoarthritis with a likely joint effusion. 2.  Tricompartmental right knee osteoarthritis, most advanced in the medial compartment.      OUTPATIENT  FOLLOW-UP / REFERRALS / PENDING TESTS        Outpatient Follow-Up Appointments  Encounter Information     You do not currently have any appointments scheduled.          Referrals  No orders of the defined types were placed in this encounter.      Test Results Pending at Discharge  Unresulted Labs (From admission, onward)     Start     Ordered    08/07/20 0600  Magnesium  Daily     Start Status   08/08/20 0600 Scheduled   08/09/20 0600 Scheduled   08/10/20 0600 Scheduled       08/06/20 1724    08/07/20 0600  Basic metabolic panel  Daily     Start Status   08/08/20 0600 Scheduled   08/09/20 0600 Scheduled   08/10/20 0600 Scheduled       08/06/20 1724                Important Issues to Address in Follow-Up  You were admitted to the hospital because  she had an episode of passing out.  This was most likely caused by dehydration and low blood pressure.  You have had some adjustments made to blood pressure medicine.  Please see the discharge medication list and take the medications as prescribed.    Please monitor your blood pressure as you are able.  Follow-up with your primary care doctor, telemetry visit is okay within 1 week.  Ideally would have your labs drawn in 1 week as well, I have given you a prescription for that.    If you have chest pain or another episode of passing out, please return to the emergency room.    Please use tylenol (acetaminopen) for the knee pain. You had steroid injection to the left knee which should help you pain as well.    DISCHARGE DISPOSITION      Disposition: Home     Code Status At Discharge: Full Code    Physician Order for Life-Sustaining Treatment Document Status      No documents found   Greater than 35 minutes spent on discharge coordination. Includes discussing discharge with consultants, family, SW, nursing.

## 2020-08-07 NOTE — PROGRESS NOTES
CARDIOLOGY DAILY PROGRESS NOTE    Raman Fields is a 50 y.o. male who was admitted for Hypokalemia [E87.6]  Syncope [R55]  Syncope, unspecified syncope type [R55].    INTERVAL HISTORY:  Feeling well without dyspnea, orthopnea.  Feeling a bit stronger.  Left knee injected by ortho yesterday.  Offers no new complaints. BP is running high this morning.    CURRENT IP MEDS:  • acetaminophen  650 mg oral q6h while awake   • amLODIPine  10 mg oral Daily   • enoxaparin  40 mg subcutaneous q12h ZAIN   • lidocaine  2 patch Topical Daily   • lisinopril-hydrochlorothiazide (ZESTORETIC) 20-12.5 combo dose   oral Daily         Objective     Vital signs in last 24 hours:  Temp:  [36.3 °C (97.4 °F)-36.9 °C (98.4 °F)] 36.3 °C (97.4 °F)  Heart Rate:  [74-95] 91  Resp:  [18] 18  BP: (121-160)/(52-93) 160/93  FiO2 (%) (Set):  [21 %] 21 %      Intake/Output Summary (Last 24 hours) at 8/7/2020 1009  Last data filed at 8/7/2020 0400  Gross per 24 hour   Intake --   Output 900 ml   Net -900 ml       WEIGHTS  Wt Readings from Last 3 Encounters:   08/07/20 (!) 216 kg (476 lb 9.6 oz)   07/08/20 (!) 226 kg (498 lb)       PHYSICAL EXAM:  Physical Exam   Constitutional: He is oriented to person, place, and time. He appears well-developed and well-nourished. No distress.   HENT:   Head: Normocephalic and atraumatic.   Eyes: Conjunctivae are normal. No scleral icterus.   Neck: Neck supple. No JVD present. Carotid bruit is not present. No thyromegaly present.   Cardiovascular: Normal rate, regular rhythm, normal heart sounds and intact distal pulses. Exam reveals no gallop and no friction rub.   No murmur heard.  Pulmonary/Chest: Effort normal. No respiratory distress.   Abdominal: Soft. Bowel sounds are normal.   Musculoskeletal: Normal range of motion. He exhibits no edema or tenderness.   Neurological: He is alert and oriented to person, place, and time.   Skin: Skin is warm and dry.   Psychiatric: His behavior is normal.       LABS:   Results  from last 7 days   Lab Units 08/07/20  0339 08/06/20  0413 08/05/20  1126   SODIUM mEQ/L 130* 134* 133*   POTASSIUM mEQ/L 4.0 3.4* 2.9*   CHLORIDE mEQ/L 94* 99 96*   CO2 mEQ/L 23 25 23   BUN mg/dL 13 12 11   CREATININE mg/dL 1.2 1.1 1.5*   GLUCOSE mg/dL 222* 98 172*   CALCIUM mg/dL 9.3 9.0 9.3       Results from last 7 days   Lab Units 08/05/20  1126   AST IU/L 20     Results from last 7 days   Lab Units 08/05/20  1126   ALT IU/L 15*     Results from last 7 days   Lab Units 08/06/20  0413 08/05/20  1126   WBC K/uL 8.89 6.64   HEMOGLOBIN g/dL 13.7 14.8   HEMATOCRIT % 40.2 43.1   PLATELETS K/uL 352* 346       Results from last 7 days   Lab Units 08/06/20  0019 08/05/20  1800 08/05/20  1126   TROPONIN I ng/mL <0.02 <0.02 <0.02     PT/PTT Results     No lab values to display.        Lab Results   Component Value Date    CHOL 166 02/14/2017    TRIG 77 02/14/2017    HDL 27 (L) 02/14/2017    LDLCALC 124 (H) 02/14/2017    DDIMER  02/13/2017     <0.27  The D-Dimer Assay can be used as an aid in the diagnosis of DVT / PE.  The test cannot be used by itself to exclude DVT or PE.  When used as a diagnostic aid, the cutoff value is the same as the reference range: <0.5 UG/ML FEU.         Imaging  Ct Head Without Iv Contrast    Result Date: 8/5/2020  IMPRESSION: No acute intracranial abnormality.    Mri Brain Without Contrast    Result Date: 8/6/2020  IMPRESSION: No intracranial mass or acute abnormality.    X-ray Chest 1 View    Result Date: 8/5/2020  IMPRESSION: No active disease in the chest    X-ray Knee Bilateral 1 Or 2 Views    Result Date: 8/6/2020  IMPRESSION: 1.  Advanced tricompartmental left knee osteoarthritis with a likely joint effusion. 2.  Tricompartmental right knee osteoarthritis, most advanced in the medial compartment.           ECG   No new ECG.    TELEMETRY  SR     Assessment/Plan   * Syncope  Assessment & Plan  Secondary to hypotension from volume depletion.  Volume and electrolye replacement.  Would  "keep off of furosemide.  Okay to continue hydrochlorothiazide for hypertension.    Manage lower extremity edema with compression stockings.    Hypokalemia  Assessment & Plan  Induced by multiple diuretics.  Replace K to above 3.5.    Sleep apnea  Assessment & Plan  Recent noncompliance with CPAP.  Resume therapy.  Patient may need readjustments in settings given \"choking\" with his CPAP.    Hypertension  Assessment & Plan  History of hypertension.  BP today high after volume replacement.  Probably back to his baseline.  Hold lasix   Resume HCTZ, lisinopril and amlodipine.    EKG, abnormal  Assessment & Plan    EKG interpretation of accelerated junctional rhythm which I do not concur with.  His EKG in my opinion shows sinus rhythm.         Patient is stable for discharge today.  Please call with any questions.  Patient may f/u with his PCP as outpatient.    Cat Mcknight, DO  8/7/2020    "

## 2020-08-07 NOTE — PROGRESS NOTES
Pt is for discharge today. TC to patient to discuss discharge planning. Inquired to patient how he will be get home. Informed when I was last here they arranged a ambulance to take me home. Informed that can be arranged but he may end up with a cost if insurance does not cover transport. Informed he will try calling around to his friends to see if he can arrange transport. Inforned pt that CC will call in a hour to see if he made arrangements. Pt receptive.       Tc from patient and made aware that his fiance will be coming to pick him up to take home. Pt stated that I will be taken down in a wheelchair and will get into the car and my sons [place] inn and assist me in the wheelchair I have at home. Discussed HC with patient and he feels that his knee is feeling better since he got the injection yesterday and his fiance is there to assist with care.

## 2020-08-07 NOTE — PROGRESS NOTES
Pt. Seen and examined; pt. Reports L knee discomfort improved post L knee injection of  Yesterday  VSS, afeb  NV intact LLE  L knee: diffuse mild tenderess, no effusion clinically  A/P: mob wbat w PT ; office followup prn

## 2021-03-10 ENCOUNTER — APPOINTMENT (EMERGENCY)
Dept: RADIOLOGY | Facility: HOSPITAL | Age: 51
End: 2021-03-10
Attending: EMERGENCY MEDICINE
Payer: COMMERCIAL

## 2021-03-10 ENCOUNTER — HOSPITAL ENCOUNTER (EMERGENCY)
Facility: HOSPITAL | Age: 51
Discharge: HOME | End: 2021-03-10
Attending: EMERGENCY MEDICINE | Admitting: EMERGENCY MEDICINE
Payer: COMMERCIAL

## 2021-03-10 VITALS
SYSTOLIC BLOOD PRESSURE: 125 MMHG | HEART RATE: 72 BPM | DIASTOLIC BLOOD PRESSURE: 89 MMHG | HEIGHT: 73 IN | TEMPERATURE: 99.2 F | WEIGHT: 315 LBS | OXYGEN SATURATION: 97 % | RESPIRATION RATE: 21 BRPM | BODY MASS INDEX: 41.75 KG/M2

## 2021-03-10 DIAGNOSIS — N39.0 URINARY TRACT INFECTION WITHOUT HEMATURIA, SITE UNSPECIFIED: Primary | ICD-10-CM

## 2021-03-10 LAB
ALBUMIN SERPL-MCNC: 3.8 G/DL (ref 3.4–5)
ALP SERPL-CCNC: 54 IU/L (ref 35–126)
ALT SERPL-CCNC: 21 IU/L (ref 16–63)
ANION GAP SERPL CALC-SCNC: 10 MEQ/L (ref 3–15)
AST SERPL-CCNC: 26 IU/L (ref 15–41)
ATRIAL RATE: 77
BACTERIA URNS QL MICRO: ABNORMAL /HPF
BASOPHILS # BLD: 0.04 K/UL (ref 0.01–0.1)
BASOPHILS NFR BLD: 0.6 %
BILIRUB SERPL-MCNC: 0.9 MG/DL (ref 0.3–1.2)
BILIRUB UR QL STRIP.AUTO: NEGATIVE MG/DL
BUN SERPL-MCNC: 10 MG/DL (ref 8–20)
CALCIUM SERPL-MCNC: 9 MG/DL (ref 8.9–10.3)
CHLORIDE SERPL-SCNC: 98 MEQ/L (ref 98–109)
CLARITY UR REFRACT.AUTO: CLEAR
CO2 SERPL-SCNC: 25 MEQ/L (ref 22–32)
COLOR UR AUTO: YELLOW
CREAT SERPL-MCNC: 1 MG/DL (ref 0.8–1.3)
DIFFERENTIAL METHOD BLD: NORMAL
EOSINOPHIL # BLD: 0.29 K/UL (ref 0.04–0.54)
EOSINOPHIL NFR BLD: 4.4 %
ERYTHROCYTE [DISTWIDTH] IN BLOOD BY AUTOMATED COUNT: 13.1 % (ref 11.6–14.4)
GFR SERPL CREATININE-BSD FRML MDRD: >60 ML/MIN/1.73M*2
GLUCOSE SERPL-MCNC: 116 MG/DL (ref 70–99)
GLUCOSE UR STRIP.AUTO-MCNC: NEGATIVE MG/DL
HCT VFR BLDCO AUTO: 43.8 % (ref 40.1–51)
HGB BLD-MCNC: 14.5 G/DL (ref 13.7–17.5)
HGB UR QL STRIP.AUTO: NEGATIVE
HYALINE CASTS #/AREA URNS LPF: ABNORMAL /LPF
IMM GRANULOCYTES # BLD AUTO: 0.02 K/UL (ref 0–0.08)
IMM GRANULOCYTES NFR BLD AUTO: 0.3 %
KETONES UR STRIP.AUTO-MCNC: NEGATIVE MG/DL
LEUKOCYTE ESTERASE UR QL STRIP.AUTO: 1
LYMPHOCYTES # BLD: 2.27 K/UL (ref 1.2–3.5)
LYMPHOCYTES NFR BLD: 34.8 %
MAGNESIUM SERPL-MCNC: 2.1 MG/DL (ref 1.8–2.5)
MCH RBC QN AUTO: 29.9 PG (ref 28–33.2)
MCHC RBC AUTO-ENTMCNC: 33.1 G/DL (ref 32.2–36.5)
MCV RBC AUTO: 90.3 FL (ref 83–98)
MONOCYTES # BLD: 0.61 K/UL (ref 0.3–1)
MONOCYTES NFR BLD: 9.3 %
NEUTROPHILS # BLD: 3.3 K/UL (ref 1.7–7)
NEUTS SEG NFR BLD: 50.6 %
NITRITE UR QL STRIP.AUTO: NEGATIVE
NRBC BLD-RTO: 0 %
P AXIS: 52
PDW BLD AUTO: 9.8 FL (ref 9.4–12.4)
PH UR STRIP.AUTO: 6.5 [PH]
PLATELET # BLD AUTO: 302 K/UL (ref 150–350)
POTASSIUM SERPL-SCNC: 4 MEQ/L (ref 3.6–5.1)
PR INTERVAL: 190
PROT SERPL-MCNC: 7.7 G/DL (ref 6–8.2)
PROT UR QL STRIP.AUTO: NEGATIVE
QRS DURATION: 88
QT INTERVAL: 396
QTC CALCULATION(BAZETT): 448
R AXIS: 2
RBC # BLD AUTO: 4.85 M/UL (ref 4.5–5.8)
RBC #/AREA URNS HPF: ABNORMAL /HPF
SODIUM SERPL-SCNC: 133 MEQ/L (ref 136–144)
SP GR UR REFRACT.AUTO: 1.02
SQUAMOUS URNS QL MICRO: 2 /HPF
T WAVE AXIS: 48
TROPONIN I SERPL-MCNC: <0.02 NG/ML
UROBILINOGEN UR STRIP-ACNC: 1 EU/DL
VENTRICULAR RATE: 77
WBC # BLD AUTO: 6.53 K/UL (ref 3.8–10.5)
WBC #/AREA URNS HPF: ABNORMAL /HPF

## 2021-03-10 PROCEDURE — 80053 COMPREHEN METABOLIC PANEL: CPT | Performed by: EMERGENCY MEDICINE

## 2021-03-10 PROCEDURE — 25800000 HC PHARMACY IV SOLUTIONS: Performed by: EMERGENCY MEDICINE

## 2021-03-10 PROCEDURE — 36415 COLL VENOUS BLD VENIPUNCTURE: CPT | Performed by: EMERGENCY MEDICINE

## 2021-03-10 PROCEDURE — 85025 COMPLETE CBC W/AUTO DIFF WBC: CPT | Performed by: EMERGENCY MEDICINE

## 2021-03-10 PROCEDURE — 84484 ASSAY OF TROPONIN QUANT: CPT | Performed by: EMERGENCY MEDICINE

## 2021-03-10 PROCEDURE — 71045 X-RAY EXAM CHEST 1 VIEW: CPT

## 2021-03-10 PROCEDURE — 63600000 HC DRUGS/DETAIL CODE: Performed by: EMERGENCY MEDICINE

## 2021-03-10 PROCEDURE — 99284 EMERGENCY DEPT VISIT MOD MDM: CPT | Mod: 25

## 2021-03-10 PROCEDURE — 83735 ASSAY OF MAGNESIUM: CPT | Performed by: EMERGENCY MEDICINE

## 2021-03-10 PROCEDURE — 93010 ELECTROCARDIOGRAM REPORT: CPT | Performed by: INTERNAL MEDICINE

## 2021-03-10 PROCEDURE — 96365 THER/PROPH/DIAG IV INF INIT: CPT

## 2021-03-10 PROCEDURE — 93005 ELECTROCARDIOGRAM TRACING: CPT | Performed by: EMERGENCY MEDICINE

## 2021-03-10 PROCEDURE — 87086 URINE CULTURE/COLONY COUNT: CPT | Performed by: EMERGENCY MEDICINE

## 2021-03-10 PROCEDURE — 3E03329 INTRODUCTION OF OTHER ANTI-INFECTIVE INTO PERIPHERAL VEIN, PERCUTANEOUS APPROACH: ICD-10-PCS | Performed by: EMERGENCY MEDICINE

## 2021-03-10 PROCEDURE — 81001 URINALYSIS AUTO W/SCOPE: CPT | Performed by: EMERGENCY MEDICINE

## 2021-03-10 RX ORDER — CEFDINIR 300 MG/1
300 CAPSULE ORAL 2 TIMES DAILY
Qty: 14 CAPSULE | Refills: 0 | Status: SHIPPED | OUTPATIENT
Start: 2021-03-10 | End: 2021-03-17

## 2021-03-10 RX ADMIN — CEFTRIAXONE SODIUM 1 G: 1 INJECTION, POWDER, FOR SOLUTION INTRAMUSCULAR; INTRAVENOUS at 16:45

## 2021-03-10 ASSESSMENT — ENCOUNTER SYMPTOMS
DIFFICULTY URINATING: 0
ABDOMINAL PAIN: 0
SHORTNESS OF BREATH: 0
COUGH: 0
MYALGIAS: 1
NAUSEA: 0
HEADACHES: 0
FEVER: 0
PALPITATIONS: 0
VOMITING: 0

## 2021-03-10 NOTE — ED PROVIDER NOTES
Emergency Medicine Note  HPI pt presents to ED for aches all over. Pt denies fever, cough, chest pain, sob, abd pain, nausea, vomiting, diarrhea, dysuria. Pt is obese and states he does not ambulate much.    HISTORY OF PRESENT ILLNESS     HPI      Patient History   PAST HISTORY     Reviewed from Nursing Triage:      Past Medical History:   Diagnosis Date   • Hypertension    • Sleep apnea        Past Surgical History:   Procedure Laterality Date   • KNEE SURGERY         Family History   Problem Relation Age of Onset   • Colon cancer Biological Mother        Social History     Tobacco Use   • Smoking status: Never Smoker   • Smokeless tobacco: Never Used   Substance Use Topics   • Alcohol use: Never     Frequency: Never   • Drug use: Never         Review of Systems   REVIEW OF SYSTEMS     Review of Systems   Constitutional: Negative for fever.   HENT: Negative for congestion.    Eyes: Negative for visual disturbance.   Respiratory: Negative for cough and shortness of breath.    Cardiovascular: Negative for chest pain, palpitations and leg swelling.   Gastrointestinal: Negative for abdominal pain, nausea and vomiting.   Genitourinary: Negative for difficulty urinating.   Musculoskeletal: Positive for myalgias.   Skin: Negative for rash.   Neurological: Negative for headaches.         VITALS     ED Vitals    Date/Time Temp Pulse Resp BP SpO2 [facility]   03/10/21 1355 37.3 °C (99.2 °F) 75 19 125/89 95 % SC   03/10/21 1201 36.9 °C (98.5 °F) 80 18 164/97 100 % KH        Pulse Ox %: 100 % (03/10/21 1400)  Pulse Ox Interpretation: Normal (03/10/21 1400)  Heart Rate: 80 (03/10/21 1400)  Rhythm Strip Interpretation: Normal Sinus Rhythm (03/10/21 1400)     Physical Exam   PHYSICAL EXAM     Physical Exam  Vitals signs and nursing note reviewed.   Constitutional:       Appearance: He is well-developed. He is obese.   HENT:      Head: Normocephalic and atraumatic.   Eyes:      Conjunctiva/sclera: Conjunctivae normal.   Neck:       Musculoskeletal: Neck supple.   Cardiovascular:      Rate and Rhythm: Normal rate and regular rhythm.      Heart sounds: No murmur.   Pulmonary:      Effort: Pulmonary effort is normal. No respiratory distress.      Breath sounds: Normal breath sounds.   Abdominal:      Palpations: Abdomen is soft.      Tenderness: There is no abdominal tenderness.   Genitourinary:     Penis: Normal.       Scrotum/Testes: Normal.   Musculoskeletal:         General: No swelling or tenderness.   Skin:     General: Skin is warm and dry.   Neurological:      General: No focal deficit present.      Mental Status: He is alert.           PROCEDURES     Procedures     DATA     Results     Procedure Component Value Units Date/Time    UA with reflex culture [[id number]]  (Abnormal) Collected: 03/10/21 1542    Specimen: Urine, Clean Catch Updated: 03/10/21 1554    Narrative:      The following orders were created for panel order UA with reflex culture.  Procedure                               Abnormality         Status                     ---------                               -----------         ------                     UA Reflex to Culture (Ma...[[id number]]  Abnormal            Final result               UA Microscopic[[id number]]               Abnormal            Final result                 Please view results for these tests on the individual orders.    UA Microscopic [[id number]]  (Abnormal) Collected: 03/10/21 1542    Specimen: Urine, Clean Catch Updated: 03/10/21 1554     RBC, Urine 0 TO 4 /HPF      WBC, Urine 10 TO 20 /HPF      Squamous Epithelial +2 /hpf      Hyaline Cast 0 TO 2 /lpf      Bacteria, Urine None Seen /HPF     UA Reflex to Culture (Macroscopic) [[id number]]  (Abnormal) Collected: 03/10/21 1542    Specimen: Urine, Clean Catch Updated: 03/10/21 1553     Color, Urine Yellow     Clarity, Urine Clear     Specific Gravity, Urine 1.024     pH, Urine 6.5     Leukocyte Esterase +1     Nitrite, Urine Negative     Protein, Urine  Negative     Glucose, Urine Negative mg/dL      Ketones, Urine Negative mg/dL      Urobilinogen, Urine 1.0 EU/dL      Bilirubin, Urine Negative mg/dL      Blood, Urine Negative     Comment: The sensitivity of the occult blood test is equivalent to approximately 4 intact RBC/HPF.       Troponin I [[id number]]  (Normal) Collected: 03/10/21 1400    Specimen: Blood, Venous Updated: 03/10/21 1438     Troponin I <0.02 ng/mL     Comprehensive metabolic panel [[id number]]  (Abnormal) Collected: 03/10/21 1400    Specimen: Blood, Venous Updated: 03/10/21 1432     Sodium 133 mEQ/L      Potassium 4.0 mEQ/L      Comment: Results obtained on plasma. Plasma Potassium values may be up to 0.4 mEQ/L less than serum values. The differences may be greater for patients with high platelet or white cell counts.        Chloride 98 mEQ/L      CO2 25 mEQ/L      BUN 10 mg/dL      Creatinine 1.0 mg/dL      Glucose 116 mg/dL      Calcium 9.0 mg/dL      AST (SGOT) 26 IU/L      ALT (SGPT) 21 IU/L      Alkaline Phosphatase 54 IU/L      Total Protein 7.7 g/dL      Comment: Test performed on plasma which typically contains approximately 0.4 g/dL more protein than serum.        Albumin 3.8 g/dL      Bilirubin, Total 0.9 mg/dL      eGFR >60.0 mL/min/1.73m*2      Anion Gap 10 mEQ/L     Magnesium [[id number]]  (Normal) Collected: 03/10/21 1400    Specimen: Blood, Venous Updated: 03/10/21 1432     Magnesium 2.1 mg/dL     CBC and differential [[id number]] Collected: 03/10/21 1400    Specimen: Blood, Venous Updated: 03/10/21 1408     WBC 6.53 K/uL      RBC 4.85 M/uL      Hemoglobin 14.5 g/dL      Hematocrit 43.8 %      MCV 90.3 fL      MCH 29.9 pg      MCHC 33.1 g/dL      RDW 13.1 %      Platelets 302 K/uL      MPV 9.8 fL      Differential Type Auto     nRBC 0.0 %      Immature Granulocytes 0.3 %      Neutrophils 50.6 %      Lymphocytes 34.8 %      Monocytes 9.3 %      Eosinophils 4.4 %      Basophils 0.6 %      Immature Granulocytes, Absolute 0.02 K/uL       Neutrophils, Absolute 3.30 K/uL      Lymphocytes, Absolute 2.27 K/uL      Monocytes, Absolute 0.61 K/uL      Eosinophils, Absolute 0.29 K/uL      Basophils, Absolute 0.04 K/uL           Imaging Results          X-RAY CHEST 1 VIEW (Final result)  Result time 03/10/21 15:26:04    Final result                 Impression:    IMPRESSION: No acute disease in the chest.                   Narrative:    CLINICAL HISTORY: Bodyaches.    COMMENT: A single frontal view of the chest was obtained using portable  technique.    COMPARISON: Multiple priors including the most recent prior chest radiograph  dated  8/5/2020.    FINDINGS:    Heart is normal in size. Mediastinal and hilar contours are normal.  No  consolidation. No pleural effusion. No pneumothorax. Regional skeletal  structures are intact.                                ECG 12 lead   Final Result          Scoring tools                               ED Course & MDM   MDM / ED COURSE and CLINICAL IMPRESSIONS     [facility]    ED Course as of Mar 10 1614   Wed Mar 10, 2021   1400 EKG: NSR @ 77 bpm, nml pr interval, nml axis , nml qrs interval, nml st-t waves    [EW]      ED Course User Index  [EW] Selvin Ortiz MD         Clinical Impressions as of Mar 10 1614   Urinary tract infection without hematuria, site unspecified            Selvin Ortiz MD  03/10/21 1614

## 2021-03-11 LAB
BACTERIA UR CULT: NORMAL
BACTERIA UR CULT: NORMAL

## 2022-02-07 ENCOUNTER — HOSPITAL ENCOUNTER (OUTPATIENT)
Facility: MEDICAL CENTER | Age: 52
End: 2022-02-07
Attending: INTERNAL MEDICINE | Admitting: INTERNAL MEDICINE
Payer: MEDICAID

## 2022-03-16 ENCOUNTER — PRE-ADMISSION TESTING (OUTPATIENT)
Dept: ADMISSIONS | Facility: MEDICAL CENTER | Age: 52
End: 2022-03-16
Attending: INTERNAL MEDICINE
Payer: MEDICAID

## 2022-03-16 VITALS — HEIGHT: 73 IN | WEIGHT: 315 LBS | BODY MASS INDEX: 41.75 KG/M2

## 2022-03-16 DIAGNOSIS — Z01.812 PRE-OPERATIVE LABORATORY EXAMINATION: ICD-10-CM

## 2022-03-16 LAB
ANION GAP SERPL CALC-SCNC: 14 MMOL/L (ref 7–16)
BUN SERPL-MCNC: 7 MG/DL (ref 8–22)
CALCIUM SERPL-MCNC: 9.3 MG/DL (ref 8.4–10.2)
CHLORIDE SERPL-SCNC: 100 MMOL/L (ref 96–112)
CO2 SERPL-SCNC: 24 MMOL/L (ref 20–33)
CREAT SERPL-MCNC: 0.94 MG/DL (ref 0.5–1.4)
GFR SERPLBLD CREATININE-BSD FMLA CKD-EPI: 97 ML/MIN/1.73 M 2
GLUCOSE SERPL-MCNC: 130 MG/DL (ref 65–99)
POTASSIUM SERPL-SCNC: 3.5 MMOL/L (ref 3.6–5.5)
SODIUM SERPL-SCNC: 138 MMOL/L (ref 135–145)

## 2022-03-16 PROCEDURE — 80048 BASIC METABOLIC PNL TOTAL CA: CPT

## 2022-03-16 PROCEDURE — 36415 COLL VENOUS BLD VENIPUNCTURE: CPT

## 2022-03-16 RX ORDER — LISINOPRIL AND HYDROCHLOROTHIAZIDE 20; 12.5 MG/1; MG/1
1 TABLET ORAL DAILY
COMMUNITY
Start: 2022-03-07

## 2022-03-16 RX ORDER — DOCUSATE SODIUM 250 MG
500 CAPSULE ORAL DAILY
COMMUNITY

## 2022-03-16 RX ORDER — SIMVASTATIN 20 MG
20 TABLET ORAL DAILY
COMMUNITY
Start: 2022-02-09

## 2022-03-16 RX ORDER — AMLODIPINE BESYLATE 10 MG/1
10 TABLET ORAL DAILY
COMMUNITY
Start: 2022-03-07

## 2022-03-16 NOTE — OR NURSING
Dr Wilcox reviewed patients medical history. Based upon information available, Dr Wilcox indicates:     In light of patient’s BMI and decreased functional capacity, it would be required for him to obtain a medical clearance prior to this procedure. Thank you.  Ysabel Wilcox M.D.  [facility]    Above note faxed to Dr Joe and called Dr Joe's office. Spoke with Lily and she will inform the medical assistant, stating they also have a note that states pt needs pulmonary clearance.     Dr Wilcox was asked:  If we do not receive the required clearance in time, will this case be cancelled?    I’m guessing so given what I have heard from my colleagues.  However, ultimately it is up to the assigned anesthesiologist to make that decision.   Ysabel Wilcox M.D.  [facility]

## 2022-03-16 NOTE — PREPROCEDURE INSTRUCTIONS
"Pre-admit appointment completed. \"Preparing for your procedure\" sheet given to pt along with verbal and written instructions. Pt instructed to continue regularly prescribed medications through the day before surgery. Pt instructed to take the following medications the day of surgery with a sip of water, per anesthesia protocol; amlodipine.    MET's=<4. Pt gest SOB when he gets out of his wheelchair due to excess body weight.     Pt to bring CPAP DOS. Reinforced pt to follow up with PCP regarding re study as appt for a couple months ago wasn't done. Also enc pt to discuss depression and wt gain (since mother passed away 2019) with PCP. Pt voices understanding.     Dr Wilcox notified via email of procedure due to BMI=65.84, HTN, MET's <4.  "

## 2022-04-15 ENCOUNTER — HOSPITAL ENCOUNTER (OUTPATIENT)
Facility: MEDICAL CENTER | Age: 52
End: 2022-04-15
Attending: INTERNAL MEDICINE | Admitting: INTERNAL MEDICINE
Payer: MEDICAID

## 2022-05-11 ENCOUNTER — PRE-ADMISSION TESTING (OUTPATIENT)
Dept: ADMISSIONS | Facility: MEDICAL CENTER | Age: 52
End: 2022-05-11
Attending: INTERNAL MEDICINE
Payer: MEDICAID

## 2022-05-11 VITALS — HEIGHT: 74 IN | BODY MASS INDEX: 40.43 KG/M2 | WEIGHT: 315 LBS

## 2022-05-11 DIAGNOSIS — Z01.812 PRE-OPERATIVE LABORATORY EXAMINATION: ICD-10-CM

## 2022-05-11 DIAGNOSIS — Z01.810 PRE-OPERATIVE CARDIOVASCULAR EXAMINATION: ICD-10-CM

## 2022-05-11 LAB
ANION GAP SERPL CALC-SCNC: 11 MMOL/L (ref 7–16)
BUN SERPL-MCNC: 8 MG/DL (ref 8–22)
CALCIUM SERPL-MCNC: 9.4 MG/DL (ref 8.5–10.5)
CHLORIDE SERPL-SCNC: 97 MMOL/L (ref 96–112)
CO2 SERPL-SCNC: 26 MMOL/L (ref 20–33)
CREAT SERPL-MCNC: 1.1 MG/DL (ref 0.5–1.4)
EKG IMPRESSION: NORMAL
GFR SERPLBLD CREATININE-BSD FMLA CKD-EPI: 80 ML/MIN/1.73 M 2
GLUCOSE SERPL-MCNC: 212 MG/DL (ref 65–99)
POTASSIUM SERPL-SCNC: 3.6 MMOL/L (ref 3.6–5.5)
SODIUM SERPL-SCNC: 134 MMOL/L (ref 135–145)

## 2022-05-11 PROCEDURE — 80048 BASIC METABOLIC PNL TOTAL CA: CPT

## 2022-05-11 PROCEDURE — 36415 COLL VENOUS BLD VENIPUNCTURE: CPT

## 2022-05-11 PROCEDURE — 93010 ELECTROCARDIOGRAM REPORT: CPT | Performed by: INTERNAL MEDICINE

## 2022-05-11 PROCEDURE — 93005 ELECTROCARDIOGRAM TRACING: CPT

## 2022-05-11 NOTE — OR NURSING
Patient here for pre-admit appt for upcoming colonoscopy 5/19 with Dr. RUBY Joe. Labs/testing per protocol done. EKG result showed A-Fib current rate pf 87. Patient in no apparent distress. He apparently has no hx of this; not on any anti-coagulants at present; no noted cardiologist. EKG results called and faxed to Dr. Darrick Joe's office; this RN spoke with his ALECIA Hong who stated she will notify him of EKG result. EKG also faxed to patient's primary MD: Ely RONDON at [facility]. and spoke with ALECIA Tarango who stated she would follow through with MD.

## 2022-05-19 RX ORDER — SODIUM CHLORIDE, SODIUM LACTATE, POTASSIUM CHLORIDE, CALCIUM CHLORIDE 600; 310; 30; 20 MG/100ML; MG/100ML; MG/100ML; MG/100ML
INJECTION, SOLUTION INTRAVENOUS CONTINUOUS
OUTPATIENT
Start: 2022-05-19 | End: 2022-05-19

## 2022-07-19 ENCOUNTER — OFFICE VISIT (OUTPATIENT)
Dept: URGENT CARE | Facility: CLINIC | Age: 52
End: 2022-07-19
Payer: MEDICAID

## 2022-07-19 VITALS
WEIGHT: 315 LBS | DIASTOLIC BLOOD PRESSURE: 86 MMHG | SYSTOLIC BLOOD PRESSURE: 132 MMHG | TEMPERATURE: 98.2 F | OXYGEN SATURATION: 96 % | HEIGHT: 73 IN | BODY MASS INDEX: 41.75 KG/M2 | HEART RATE: 93 BPM | RESPIRATION RATE: 16 BRPM

## 2022-07-19 DIAGNOSIS — R10.11 RUQ ABDOMINAL PAIN: ICD-10-CM

## 2022-07-19 DIAGNOSIS — K76.0 HEPATIC STEATOSIS: ICD-10-CM

## 2022-07-19 LAB
APPEARANCE UR: NORMAL
BILIRUB UR STRIP-MCNC: NEGATIVE MG/DL
COLOR UR AUTO: NORMAL
GLUCOSE UR STRIP.AUTO-MCNC: NEGATIVE MG/DL
KETONES UR STRIP.AUTO-MCNC: NORMAL MG/DL
LEUKOCYTE ESTERASE UR QL STRIP.AUTO: NORMAL
NITRITE UR QL STRIP.AUTO: NEGATIVE
PH UR STRIP.AUTO: 6 [PH] (ref 5–8)
PROT UR QL STRIP: 30 MG/DL
RBC UR QL AUTO: NEGATIVE
SP GR UR STRIP.AUTO: 1.02
UROBILINOGEN UR STRIP-MCNC: 0.2 MG/DL

## 2022-07-19 PROCEDURE — 81002 URINALYSIS NONAUTO W/O SCOPE: CPT | Performed by: PHYSICIAN ASSISTANT

## 2022-07-19 PROCEDURE — 99203 OFFICE O/P NEW LOW 30 MIN: CPT | Performed by: PHYSICIAN ASSISTANT

## 2022-07-19 NOTE — PROGRESS NOTES
"Subjective:   Panchito Sepulveda is a 52 y.o. male who presents for Abdominal Pain (X 2 weeks.  Pt. Was sent to do an US but he did not have it done. ) and UTI (X 1 week with dark urine and low back pain.  Denies fever or chills.)      HPI  The patient is a 52-year-old male who presents to the urgent care with complaints of abdominal pain onset approximately 3 weeks ago.  Location to the upper abdomen worse on the right side.  He was seen by his PCP and an ultrasound was ordered but he tried calling to schedule this, however he believes it was the wrong number and sent him to a fax number.  His pain did almost resolved and then returned recently.  It is a crampy pain sometimes a sharp pain to his left upper quadrant.  Pain is worse after eating.  He has a decreased appetite.  Currently, 4/10 in severity.  He has had diarrhea once or twice but been having normal BMs as well.  Last BM 2 days ago and sometimes he goes every other day.  He also reports of dark yellow urine. Denies any fever, chills, chest pain, SOB, nausea, vomiting, bloody or black stools, dysuria, urinary urgency. Denies history of diabetes.  Denies any abdominal surgeries.          Medications:    • amLODIPine Tabs  • docusate sodium  • lisinopril-hydrochlorothiazide  • MUCINEX PO  • simvastatin Tabs    Allergies: Other environmental    Problem List: Panchito Sepulveda does not have a problem list on file.    Surgical History:  No past surgical history on file.    Past Social Hx: Panchito Sepulveda  reports that he has never smoked. He has never used smokeless tobacco. He reports previous alcohol use. He reports that he does not use drugs.     Past Family Hx:  Panchito Sepulveda family history is not on file.     Problem list, medications, and allergies reviewed by myself today in Epic.     Objective:     /86   Pulse 93   Temp 36.8 °C (98.2 °F) (Temporal)   Resp 16   Ht 1.854 m (6' 1\")   Wt (!) 204 kg (450 lb)   SpO2 96%   BMI 59.37 kg/m² "     Physical Exam  Vitals reviewed.   Constitutional:       General: He is not in acute distress.     Appearance: He is obese. He is not ill-appearing or toxic-appearing.   HENT:      Mouth/Throat:      Mouth: Mucous membranes are moist.      Pharynx: Oropharynx is clear.   Eyes:      Conjunctiva/sclera: Conjunctivae normal.      Pupils: Pupils are equal, round, and reactive to light.   Cardiovascular:      Rate and Rhythm: Normal rate and regular rhythm.      Heart sounds: Normal heart sounds.   Pulmonary:      Effort: Pulmonary effort is normal. No respiratory distress.      Breath sounds: Normal breath sounds. No wheezing, rhonchi or rales.   Abdominal:      General: Bowel sounds are normal. There is no distension.      Palpations: Abdomen is soft. There is no hepatomegaly, splenomegaly or mass.      Tenderness: There is abdominal tenderness in the right upper quadrant. There is no right CVA tenderness, left CVA tenderness, guarding or rebound. Negative signs include McBurney's sign.   Musculoskeletal:      Cervical back: Neck supple.   Skin:     General: Skin is warm and dry.   Neurological:      General: No focal deficit present.      Mental Status: He is alert and oriented to person, place, and time.   Psychiatric:         Mood and Affect: Mood normal.         Behavior: Behavior normal.         Diagnosis and associated orders:     1. RUQ abdominal pain  - US-RUQ; Future  - POCT Urinalysis       Comments/MDM:     • This is a 52-year-old male who presents to clinic with complaints of upper abdominal pain.  Exam findings locate the abdominal pain to the right upper quadrant.  Onset of symptoms 3 weeks ago that have been waxing and waning.  He is afebrile in no acute distress.  He is tolerating fluids and has a decreased appetite.  We will further evaluate with RUQ ultrasound scheduled for tomorrow.  We will call back with results.  In the meantime, avoid fatty foods or any exacerbating factors.  Increase fluid  intake.  Patient understands to present immediately to the ER if any worsening abdominal pain, fevers, vomiting, or any other concerns       I personally reviewed prior external notes and test results pertinent to today's visit. Red flags discussed and indications to present to the Emergency Department.  Supportive care, natural history, differential diagnoses, and indications for immediate follow-up discussed. Patient expresses understanding and agrees to plan. Patient denies any other questions or concerns.     Follow-up with the primary care physician for recheck, reevaluation, and consideration of further management.    Please note that this dictation was created using voice recognition software. I have made a reasonable attempt to correct obvious errors, but I expect that there are errors of grammar and possibly content that I did not discover before finalizing the note.    This note was electronically signed by Darren Dominguez PA-C

## 2022-07-20 ENCOUNTER — HOSPITAL ENCOUNTER (OUTPATIENT)
Dept: RADIOLOGY | Facility: MEDICAL CENTER | Age: 52
End: 2022-07-20
Attending: PHYSICIAN ASSISTANT
Payer: MEDICAID

## 2022-07-20 DIAGNOSIS — R10.11 RUQ ABDOMINAL PAIN: ICD-10-CM

## 2022-07-20 PROCEDURE — 76705 ECHO EXAM OF ABDOMEN: CPT
